# Patient Record
Sex: FEMALE | Race: WHITE | Employment: FULL TIME | URBAN - NONMETROPOLITAN AREA
[De-identification: names, ages, dates, MRNs, and addresses within clinical notes are randomized per-mention and may not be internally consistent; named-entity substitution may affect disease eponyms.]

---

## 2018-05-10 ENCOUNTER — HOSPITAL ENCOUNTER (OUTPATIENT)
Dept: MRI IMAGING | Age: 58
Discharge: HOME | End: 2018-05-10
Payer: COMMERCIAL

## 2018-05-10 DIAGNOSIS — S89.91XA INJURY OF RIGHT KNEE, INITIAL ENCOUNTER: ICD-10-CM

## 2018-05-10 PROCEDURE — 73721 MRI JNT OF LWR EXTRE W/O DYE: CPT

## 2018-05-24 ENCOUNTER — HOSPITAL ENCOUNTER (EMERGENCY)
Age: 58
Discharge: HOME | End: 2018-05-24
Attending: EMERGENCY MEDICINE
Payer: COMMERCIAL

## 2018-05-24 ENCOUNTER — APPOINTMENT (OUTPATIENT)
Dept: GENERAL RADIOLOGY | Age: 58
End: 2018-05-24
Payer: COMMERCIAL

## 2018-05-24 VITALS
HEART RATE: 69 BPM | RESPIRATION RATE: 18 BRPM | SYSTOLIC BLOOD PRESSURE: 167 MMHG | WEIGHT: 189 LBS | BODY MASS INDEX: 35.68 KG/M2 | HEIGHT: 61 IN | DIASTOLIC BLOOD PRESSURE: 84 MMHG | OXYGEN SATURATION: 99 % | TEMPERATURE: 98 F

## 2018-05-24 DIAGNOSIS — S50.11XA CONTUSION OF RIGHT FOREARM, INITIAL ENCOUNTER: Primary | ICD-10-CM

## 2018-05-24 PROCEDURE — 99283 EMERGENCY DEPT VISIT LOW MDM: CPT

## 2018-05-24 PROCEDURE — 73090 X-RAY EXAM OF FOREARM: CPT

## 2018-05-24 RX ORDER — SIMVASTATIN 20 MG
TABLET ORAL
COMMUNITY
Start: 2017-12-11

## 2018-05-24 RX ORDER — CETIRIZINE HYDROCHLORIDE 10 MG/1
TABLET ORAL
COMMUNITY
Start: 2018-05-03

## 2018-05-24 ASSESSMENT — PAIN DESCRIPTION - ORIENTATION
ORIENTATION: RIGHT
ORIENTATION: RIGHT

## 2018-05-24 ASSESSMENT — PAIN DESCRIPTION - DESCRIPTORS
DESCRIPTORS: ACHING;DISCOMFORT
DESCRIPTORS: ACHING

## 2018-05-24 ASSESSMENT — PAIN SCALES - GENERAL
PAINLEVEL_OUTOF10: 7
PAINLEVEL_OUTOF10: 5

## 2018-05-24 ASSESSMENT — PAIN DESCRIPTION - LOCATION
LOCATION: ARM
LOCATION: WRIST

## 2018-05-24 ASSESSMENT — PAIN DESCRIPTION - PAIN TYPE: TYPE: ACUTE PAIN

## 2018-09-25 ENCOUNTER — HOSPITAL ENCOUNTER (OUTPATIENT)
Age: 58
Discharge: HOME | End: 2018-09-25
Payer: COMMERCIAL

## 2018-09-25 ENCOUNTER — HOSPITAL ENCOUNTER (OUTPATIENT)
Dept: GENERAL RADIOLOGY | Age: 58
Discharge: HOME | End: 2018-09-25
Payer: COMMERCIAL

## 2018-09-25 DIAGNOSIS — R52 PAIN: ICD-10-CM

## 2018-09-25 PROCEDURE — 73564 X-RAY EXAM KNEE 4 OR MORE: CPT

## 2019-07-02 ENCOUNTER — HOSPITAL ENCOUNTER (OUTPATIENT)
Dept: GENERAL RADIOLOGY | Age: 59
Discharge: HOME | End: 2019-07-02
Payer: COMMERCIAL

## 2019-07-02 ENCOUNTER — HOSPITAL ENCOUNTER (OUTPATIENT)
Age: 59
Discharge: HOME | End: 2019-07-02
Payer: COMMERCIAL

## 2019-07-02 DIAGNOSIS — M25.561 RIGHT KNEE PAIN, UNSPECIFIED CHRONICITY: ICD-10-CM

## 2019-07-02 PROCEDURE — 73564 X-RAY EXAM KNEE 4 OR MORE: CPT

## 2019-12-10 ENCOUNTER — HOSPITAL ENCOUNTER (OUTPATIENT)
Age: 59
End: 2019-12-10

## 2019-12-10 ENCOUNTER — HOSPITAL ENCOUNTER (OUTPATIENT)
Age: 59
Discharge: HOME | End: 2019-12-10
Payer: COMMERCIAL

## 2019-12-10 ENCOUNTER — HOSPITAL ENCOUNTER (OUTPATIENT)
Dept: GENERAL RADIOLOGY | Age: 59
Discharge: HOME | End: 2019-12-10
Payer: COMMERCIAL

## 2019-12-10 DIAGNOSIS — M25.561 RIGHT KNEE PAIN, UNSPECIFIED CHRONICITY: ICD-10-CM

## 2019-12-10 PROCEDURE — 73564 X-RAY EXAM KNEE 4 OR MORE: CPT

## 2020-01-13 ENCOUNTER — HOSPITAL ENCOUNTER (OUTPATIENT)
Dept: ULTRASOUND IMAGING | Age: 60
Discharge: HOME | End: 2020-01-13
Payer: COMMERCIAL

## 2020-01-13 PROCEDURE — 76770 US EXAM ABDO BACK WALL COMP: CPT

## 2020-06-09 ENCOUNTER — HOSPITAL ENCOUNTER (OUTPATIENT)
Age: 60
Discharge: HOME | End: 2020-06-09
Payer: COMMERCIAL

## 2020-06-09 ENCOUNTER — HOSPITAL ENCOUNTER (OUTPATIENT)
Dept: GENERAL RADIOLOGY | Age: 60
Discharge: HOME | End: 2020-06-09
Payer: COMMERCIAL

## 2020-06-09 PROCEDURE — 73564 X-RAY EXAM KNEE 4 OR MORE: CPT

## 2020-11-04 ENCOUNTER — HOSPITAL ENCOUNTER (OUTPATIENT)
Dept: ULTRASOUND IMAGING | Age: 60
Discharge: HOME | End: 2020-11-04
Payer: COMMERCIAL

## 2020-11-04 PROCEDURE — 76700 US EXAM ABDOM COMPLETE: CPT

## 2020-12-08 ENCOUNTER — HOSPITAL ENCOUNTER (OUTPATIENT)
Age: 60
Discharge: HOME | End: 2020-12-08
Payer: COMMERCIAL

## 2020-12-08 ENCOUNTER — HOSPITAL ENCOUNTER (OUTPATIENT)
Dept: GENERAL RADIOLOGY | Age: 60
Discharge: HOME | End: 2020-12-08
Payer: COMMERCIAL

## 2020-12-08 PROCEDURE — 73564 X-RAY EXAM KNEE 4 OR MORE: CPT

## 2021-01-12 ENCOUNTER — OFFICE VISIT (OUTPATIENT)
Dept: SURGERY | Age: 61
End: 2021-01-12
Payer: COMMERCIAL

## 2021-01-12 VITALS
BODY MASS INDEX: 35.87 KG/M2 | RESPIRATION RATE: 14 BRPM | HEIGHT: 61 IN | DIASTOLIC BLOOD PRESSURE: 78 MMHG | WEIGHT: 190 LBS | TEMPERATURE: 98.2 F | OXYGEN SATURATION: 98 % | SYSTOLIC BLOOD PRESSURE: 122 MMHG | HEART RATE: 96 BPM

## 2021-01-12 DIAGNOSIS — K80.10 CHRONIC CHOLECYSTITIS WITH CALCULUS: Primary | ICD-10-CM

## 2021-01-12 DIAGNOSIS — I10 ESSENTIAL HYPERTENSION: ICD-10-CM

## 2021-01-12 DIAGNOSIS — Z01.818 PRE-OP TESTING: ICD-10-CM

## 2021-01-12 PROCEDURE — 99244 OFF/OP CNSLTJ NEW/EST MOD 40: CPT | Performed by: SURGERY

## 2021-01-12 PROCEDURE — G8484 FLU IMMUNIZE NO ADMIN: HCPCS | Performed by: SURGERY

## 2021-01-12 PROCEDURE — G8417 CALC BMI ABV UP PARAM F/U: HCPCS | Performed by: SURGERY

## 2021-01-12 PROCEDURE — G8427 DOCREV CUR MEDS BY ELIG CLIN: HCPCS | Performed by: SURGERY

## 2021-01-12 RX ORDER — CYCLOSPORINE 0.5 MG/ML
1 EMULSION OPHTHALMIC 2 TIMES DAILY
COMMUNITY

## 2021-01-12 RX ORDER — LOSARTAN POTASSIUM 50 MG/1
TABLET ORAL
COMMUNITY
Start: 2020-11-28

## 2021-01-12 RX ORDER — OMEPRAZOLE 20 MG/1
CAPSULE, DELAYED RELEASE ORAL
COMMUNITY
Start: 2020-12-04

## 2021-01-12 RX ORDER — ERGOCALCIFEROL 1.25 MG/1
CAPSULE ORAL
COMMUNITY
Start: 2020-11-09

## 2021-01-12 RX ORDER — AZELASTINE HYDROCHLORIDE 137 UG/1
SPRAY, METERED NASAL
COMMUNITY
Start: 2020-11-29

## 2021-01-12 ASSESSMENT — ENCOUNTER SYMPTOMS
CHOKING: 0
EYE REDNESS: 0
VOMITING: 0
VOICE CHANGE: 0
SINUS PRESSURE: 0
TROUBLE SWALLOWING: 0
SHORTNESS OF BREATH: 0
CONSTIPATION: 0
DIARRHEA: 0
STRIDOR: 0
NAUSEA: 0
ABDOMINAL DISTENTION: 0
EYE PAIN: 0
COUGH: 0
ABDOMINAL PAIN: 1
RHINORRHEA: 0
EYE DISCHARGE: 0
CHEST TIGHTNESS: 0
BACK PAIN: 0
PHOTOPHOBIA: 0
BLOOD IN STOOL: 0
WHEEZING: 0
COLOR CHANGE: 0
RECTAL PAIN: 0
FACIAL SWELLING: 0
EYE ITCHING: 0
ANAL BLEEDING: 0
SORE THROAT: 0
APNEA: 0

## 2021-01-12 NOTE — PROGRESS NOTES
Subjective:      Patient ID: Bill Finney is a 56 y.o. female. Chief Complaint   Patient presents with    Surgical Consult     new patient- ref K. leopold- gallstones- fatty liver- US 11/4       HPI    Review of Systems   Constitutional: Negative for activity change, appetite change, chills, diaphoresis, fatigue, fever and unexpected weight change. HENT: Negative for congestion, dental problem, drooling, ear discharge, ear pain, facial swelling, hearing loss, mouth sores, nosebleeds, postnasal drip, rhinorrhea, sinus pressure, sneezing, sore throat, tinnitus, trouble swallowing and voice change. Eyes: Negative for photophobia, pain, discharge, redness, itching and visual disturbance. Respiratory: Negative for apnea, cough, choking, chest tightness, shortness of breath, wheezing and stridor. Cardiovascular: Negative for chest pain, palpitations and leg swelling. Gastrointestinal: Positive for abdominal pain. Negative for abdominal distention, anal bleeding, blood in stool, constipation, diarrhea, nausea, rectal pain and vomiting. Genitourinary: Negative for decreased urine volume, difficulty urinating, dyspareunia, dysuria, enuresis, flank pain, frequency, genital sores, hematuria, menstrual problem, pelvic pain, urgency, vaginal bleeding, vaginal discharge and vaginal pain. Musculoskeletal: Negative for arthralgias, back pain, gait problem, joint swelling, myalgias, neck pain and neck stiffness. Skin: Negative for color change, pallor, rash and wound. Neurological: Negative for dizziness, tremors, seizures, syncope, facial asymmetry, speech difficulty, weakness, light-headedness, numbness and headaches. Hematological: Negative for adenopathy. Does not bruise/bleed easily. Psychiatric/Behavioral: Negative for agitation, behavioral problems, confusion, decreased concentration, dysphoric mood, hallucinations, self-injury, sleep disturbance and suicidal ideas. The patient is not nervous/anxious and is not hyperactive.       /78 (Site: Right Upper Arm, Position: Sitting, Cuff Size: Medium Adult)   Pulse 96   Temp 98.2 °F (36.8 °C) (Temporal)   Resp 14   Ht 5' 1\" (1.549 m)   Wt 190 lb (86.2 kg)   SpO2 98%   BMI 35.90 kg/m²     Objective:   Physical Exam    Assessment:            Plan:              [employer], LPN

## 2021-01-12 NOTE — PROGRESS NOTES
Nargis Blanco. Carol, [address]  [phone]  New Patient Evaluation in Office    Pt Name: Bill Finney  Date of Birth 1960   Today's Date: 1/12/2021  Medical Record Number: [id number]  Referring Provider: Simeon Garcia MD  Primary Care Provider: Mary Welch MD  Chief Complaint   Patient presents with    Surgical Consult     new patient- ref K. leopold- gallstones- fatty liver- US 11/4     ASSESSMENT       Diagnosis Orders   1. Chronic cholecystitis with calculus  LAPAROSCOPY CHOLECYSTECTOMY    Hemoglobin and Hematocrit, Blood    Hepatic Function Panel    Covid-19 Ambulatory    Basic Metabolic Panel    EKG 12 Lead   2. Pre-op testing  Hemoglobin and Hematocrit, Blood    Hepatic Function Panel    Covid-19 Ambulatory    Basic Metabolic Panel    EKG 12 Lead   3. Essential hypertension  Hemoglobin and Hematocrit, Blood    Hepatic Function Panel    Covid-19 Ambulatory    Basic Metabolic Panel    EKG 12 Lead     Past Medical History:   Diagnosis Date    Arthritis     knees    Depression     Essential hypertension     Fatty liver     Pure hypercholesterolemia     Reactive airway disease     Ureteral stone           PLANS      1.  Schedule Dianne for L/S cholecystectomy possible open 2. The risks, options and alternatives were discussed at length with the patient. The risks including bleeding, infection, reoperation, bile duct injury and possible conversion to an open procedure were covered as well as nonresolution of pain. The possibility of other unforeseen complications including bile leak were also mentioned. Patient was made aware of intraoperative complications such as other organ injury or injury to surrounding structures. We also discussed the conduct of the operation, probable outpatient stay postoperatively and the typical post operative recovery and restrictions. The patients questions were answered. After this discussion, the patient would like to proceed with cholecystectomy. 3. Status: outpatient  4. Planned anesthesia: general  5. She will undergo pre-operative clearance per anesthesia guidelines with risk factors listed under the past medical history diagnosis & problem list.     Twyla Villalpando is a 56 y.o. female seen in the consultation for evaluation regarding gallbladder. Onset of symptoms was gradual 6 months ago with gradually worsened since that time. The symptoms have included right upper quadrant pain and pain radiating to the back. Chip Velasquez describes the pain as aching, recurrent and moderate in severity. Her symptoms are worse with breads and improved with avoiding these foods. She denies any history of pancreatitis, jaundice, pancreatitis or ulcer disease. Symptoms have been unrelieved by antacids. She also complains of some LUQ pain that is near a robotic surgical incision. I have personally reviewed the following films:  US performed on 11/4/20 which showed fatty infiltration of the liver, gallstones without wall thickening or ductal dilation.    Past Medical History  Past Medical History:   Diagnosis Date    Arthritis     knees    Depression     Essential hypertension     Fatty liver     Pure hypercholesterolemia     Reactive airway disease  Ureteral stone      Past Surgical History  Past Surgical History:   Procedure Laterality Date    BLADDER REPAIR      COLONOSCOPY      CYSTOSCOPY  01/01/2011    stent placed     HYSTERECTOMY  12/14/2012 Dr Macrina Downey Hysterectomy Per Dr Ana Dillon. Sacral Colpopexy, single Incision Sub Urethral Sling Placed and Removed Due to Urethral Injury by Dr Stark Reas LITHOTRIPSY  01/01/2011    OVARY REMOVAL  01/01/2008    right     Medications  Current Outpatient Medications   Medication Sig Dispense Refill    losartan (COZAAR) 50 MG tablet TAKE 1 TABLET BY MOUTH ONCE DAILY      omeprazole (PRILOSEC) 20 MG delayed release capsule TAKE 1 CAPSULE BY MOUTH ONCE DAILY      Azelastine HCl 137 MCG/SPRAY SOLN USE 1 SPRAY(S) IN EACH NOSTRIL TWICE DAILY      cycloSPORINE (RESTASIS) 0.05 % ophthalmic emulsion 1 drop 2 times daily      simvastatin (ZOCOR) 20 MG tablet   See Instructions, TAKE ONE TABLET BY MOUTH ONCE DAILY, tabs, 11 Refill(s), # 30, eRx: [id number]      cetirizine (ZYRTEC) 10 MG tablet   1 tabs, Oral, Daily, PRN, # 10 tabs, 0 Refill(s)      tolterodine (DETROL LA) 4 MG ER capsule Take 4 mg by mouth daily.  Cetirizine HCl (ZYRTEC ALLERGY PO) Take  by mouth as needed.  Acetaminophen (TYLENOL EXTRA STRENGTH PO) Take  by mouth as needed.  Ibuprofen (MOTRIN PO) Take  by mouth as needed.  vitamin D (ERGOCALCIFEROL) 1.25 MG (84777 UT) CAPS capsule TAKE 1 CAPSULE BY MOUTH WEEKLY FOR 8 WEEKS       No current facility-administered medications for this visit. Allergies  is allergic to amoxicillin-pot clavulanate. Family History  family history includes Cancer in her mother; Heart Disease in her father and mother; Stroke in her paternal grandfather and paternal grandmother.   Social History reports that she quit smoking about 31 years ago. She has never used smokeless tobacco. She reports current alcohol use. She reports that she does not use drugs. Health Screening Exams  Health Maintenance   Topic Date Due    Hepatitis C screen  1960    Lipid screen  05/07/1968    HIV screen  05/07/1973    DTaP/Tdap/Td vaccine (1 - Tdap) 05/07/1977    Cervical cancer screen  05/07/1979    Diabetes screen  05/07/1998    Breast cancer screen  05/07/2008    Shingles Vaccine (1 of 2) 05/07/2008    Colon cancer screen colonoscopy  05/07/2008    Flu vaccine (1) 09/01/2020    Hepatitis A vaccine  Aged Out    Hepatitis B vaccine  Aged Out    Hib vaccine  Aged Out    Meningococcal (ACWY) vaccine  Aged Out    Pneumococcal 0-64 years Vaccine  Aged Out     Review of Systems  Constitutional: Negative for activity change, appetite change, chills, diaphoresis, fatigue, fever and unexpected weight change. HENT: Negative for congestion, dental problem, drooling, ear discharge, ear pain, facial swelling, hearing loss, mouth sores, nosebleeds, postnasal drip, rhinorrhea, sinus pressure, sneezing, sore throat, tinnitus, trouble swallowing and voice change. Eyes: Negative for photophobia, pain, discharge, redness, itching and visual disturbance. Respiratory: Negative for apnea, cough, choking, chest tightness, shortness of breath, wheezing and stridor. Cardiovascular: Negative for chest pain, palpitations and leg swelling. Gastrointestinal: Positive for abdominal pain. Negative for abdominal distention, anal bleeding, blood in stool, constipation, diarrhea, nausea, rectal pain and vomiting. Genitourinary: Negative for decreased urine volume, difficulty urinating, dyspareunia, dysuria, enuresis, flank pain, frequency, genital sores, hematuria, menstrual problem, pelvic pain, urgency, vaginal bleeding, vaginal discharge and vaginal pain. Musculoskeletal: Negative for arthralgias, back pain, gait problem, joint swelling, myalgias, neck pain and neck stiffness. Skin: Negative for color change, pallor, rash and wound. Neurological: Negative for dizziness, tremors, seizures, syncope, facial asymmetry, speech difficulty, weakness, light-headedness, numbness and headaches. Hematological: Negative for adenopathy. Does not bruise/bleed easily. Psychiatric/Behavioral: Negative for agitation, behavioral problems, confusion, decreased concentration, dysphoric mood, hallucinations, self-injury, sleep disturbance and suicidal ideas. The patient is not nervous/anxious and is not hyperactive. OBJECTIVE      VITALS:  height is 5' 1\" (1.549 m) and weight is 190 lb (86.2 kg). Her temporal temperature is 98.2 °F (36.8 °C). Her blood pressure is 122/78 and her pulse is 96. Her respiration is 14 and oxygen saturation is 98%. Pain Score:   0 - No pain Body mass index is 35.9 kg/m². CONSTITUTIONAL: Alert and oriented times 3, no acute distress and cooperative to examination with proper mood and affect. SKIN: Skin color, texture, turgor normal. No rashes or lesions. LYMPH: no cervical nodes, no inguinal nodes  HEENT: Head is normocephalic, atraumatic. EOMI, PERRLA. NECK: Supple, symmetrical, trachea midline, no adenopathy, thyroid symmetric, not enlarged and no tenderness, skin normal.  CHEST/LUNGS: chest symmetric with normal A/P diameter, normal respiratory rate and rhythm, lungs clear to auscultation without wheezes, rales or rhonchi. No accessory muscle use. Scars None   CARDIOVASCULAR: Heart sounds are normal.  Regular rate and rhythm without murmur, gallop or rub. Normal S1 and S2. Carotid and femoral pulses 2+/4 and equal bilaterally. ABDOMEN: Normal shape. Laparoscopic scar(s) present. Normal bowel sounds. No bruits. soft, nontender, nondistended, no masses or organomegaly. no evidence of hernia. Percussion: Normal without hepatosplenomegally. Gallbladder is nonpalpable and non tender. RECTAL: deferred, not clinically indicated  NEUROLOGIC: There are no focalizing motor or sensory deficits. CN II-XII are grossly intact. Eren Kick EXTREMITIES: no cyanosis, no clubbing and no edema. Thank you for the interesting evaluation. Further recommendations as listed above.        Electronically signed by Adrienne Meyer DO on 1/12/2021 at 12:12 PM

## 2021-01-12 NOTE — [place]  [address] [facility]. [address]  [place] [address]  [phone]     Pt Name: [facility] Record Number: [id number]  Date of Birth 1960   Today's Date: 1/12/2021 [address] Mitali Eastman was seen in consultation in the office today. My assessment and plans are listed below. ASSESSMENT      Diagnosis Orders   1. Chronic cholecystitis with calculus  LAPAROSCOPY CHOLECYSTECTOMY    Hemoglobin and Hematocrit, Blood    Hepatic Function Panel    Covid-19 Ambulatory    Basic Metabolic Panel    EKG 12 Lead   2. Pre-op testing  Hemoglobin and Hematocrit, Blood    Hepatic Function Panel    Covid-19 Ambulatory    Basic Metabolic Panel    EKG 12 Lead   3. Essential hypertension  Hemoglobin and Hematocrit, Blood    Hepatic Function Panel    Covid-19 Ambulatory    Basic Metabolic Panel    EKG 12 Lead     Past Medical History:   Diagnosis Date    Arthritis     knees    Depression     Essential hypertension     Fatty liver     Pure hypercholesterolemia     Reactive airway disease     Ureteral stone          PLANS:   1. Schedule Dianne for L/S cholecystectomy possible open  2. The risks, options and alternatives were discussed at length with the patient. The risks including bleeding, infection, reoperation, bile duct injury and possible conversion to an open procedure were covered as well as nonresolution of pain. The possibility of other unforeseen complications including bile leak were also mentioned. Patient was made aware of intraoperative complications such as other organ injury or injury to surrounding structures. We also discussed the conduct of the operation, probable outpatient stay postoperatively and the typical post operative recovery and restrictions. The patients questions were answered. After this discussion, the patient would like to proceed with cholecystectomy. 3. Status: outpatient  4.  Planned anesthesia: general 5. She will undergo pre-operative clearance per anesthesia guidelines with risk factors listed under the past medical history diagnosis & problem list.    If I can provide any additional assistance or you have any concerns, please feel free to contact me. Thank you for allowing to participate in the care of your patients. Sincerely,      Samantha Armstrong.  Florence Medina

## 2021-02-15 ENCOUNTER — APPOINTMENT (OUTPATIENT)
Dept: GENERAL RADIOLOGY | Age: 61
End: 2021-02-15
Payer: COMMERCIAL

## 2021-02-15 ENCOUNTER — HOSPITAL ENCOUNTER (EMERGENCY)
Age: 61
Discharge: HOME | End: 2021-02-15
Attending: EMERGENCY MEDICINE
Payer: COMMERCIAL

## 2021-02-15 VITALS
SYSTOLIC BLOOD PRESSURE: 135 MMHG | HEIGHT: 61 IN | HEART RATE: 85 BPM | WEIGHT: 186 LBS | RESPIRATION RATE: 17 BRPM | BODY MASS INDEX: 35.12 KG/M2 | OXYGEN SATURATION: 94 % | DIASTOLIC BLOOD PRESSURE: 66 MMHG

## 2021-02-15 DIAGNOSIS — S42.202A CLOSED FRACTURE OF PROXIMAL END OF LEFT HUMERUS, UNSPECIFIED FRACTURE MORPHOLOGY, INITIAL ENCOUNTER: Primary | ICD-10-CM

## 2021-02-15 PROCEDURE — 99283 EMERGENCY DEPT VISIT LOW MDM: CPT

## 2021-02-15 PROCEDURE — L3660 SO 8 AB RSTR CAN/WEB PRE OTS: HCPCS

## 2021-02-15 PROCEDURE — 6370000000 HC RX 637 (ALT 250 FOR IP): Performed by: EMERGENCY MEDICINE

## 2021-02-15 PROCEDURE — 73030 X-RAY EXAM OF SHOULDER: CPT

## 2021-02-15 RX ORDER — HYDROCODONE BITARTRATE AND ACETAMINOPHEN 5; 325 MG/1; MG/1
1 TABLET ORAL ONCE
Status: COMPLETED | OUTPATIENT
Start: 2021-02-15 | End: 2021-02-15

## 2021-02-15 RX ADMIN — HYDROCODONE BITARTRATE AND ACETAMINOPHEN 1 TABLET: 5; 325 TABLET ORAL at 19:32

## 2021-02-15 ASSESSMENT — ENCOUNTER SYMPTOMS
RESPIRATORY NEGATIVE: 1
BACK PAIN: 0

## 2021-02-15 ASSESSMENT — PAIN - FUNCTIONAL ASSESSMENT: PAIN_FUNCTIONAL_ASSESSMENT: PREVENTS OR INTERFERES SOME ACTIVE ACTIVITIES AND ADLS

## 2021-02-15 ASSESSMENT — PAIN SCALES - GENERAL
PAINLEVEL_OUTOF10: 8
PAINLEVEL_OUTOF10: 5

## 2021-02-15 ASSESSMENT — PAIN DESCRIPTION - ORIENTATION: ORIENTATION: LEFT

## 2021-02-15 ASSESSMENT — PAIN DESCRIPTION - LOCATION: LOCATION: SHOULDER

## 2021-02-15 ASSESSMENT — PAIN DESCRIPTION - PAIN TYPE: TYPE: ACUTE PAIN

## 2021-02-16 ENCOUNTER — HOSPITAL ENCOUNTER (OUTPATIENT)
Dept: CT IMAGING | Age: 61
Discharge: HOME | End: 2021-02-16
Payer: COMMERCIAL

## 2021-02-16 ENCOUNTER — HOSPITAL ENCOUNTER (OUTPATIENT)
Dept: GENERAL RADIOLOGY | Age: 61
Discharge: HOME | End: 2021-02-16
Payer: COMMERCIAL

## 2021-02-16 ENCOUNTER — HOSPITAL ENCOUNTER (OUTPATIENT)
Age: 61
Discharge: HOME | End: 2021-02-16
Payer: COMMERCIAL

## 2021-02-16 DIAGNOSIS — S42.295A OTHER CLOSED NONDISPLACED FRACTURE OF PROXIMAL END OF LEFT HUMERUS, INITIAL ENCOUNTER: ICD-10-CM

## 2021-02-16 DIAGNOSIS — Z01.811 ENCOUNTER FOR PREPROCEDURAL RESPIRATORY EXAMINATION: ICD-10-CM

## 2021-02-16 DIAGNOSIS — Z01.812 ENCOUNTER FOR PREPROCEDURAL LABORATORY EXAMINATION: ICD-10-CM

## 2021-02-16 DIAGNOSIS — Z01.810 ENCOUNTER FOR PREPROCEDURAL CARDIOVASCULAR EXAMINATION: ICD-10-CM

## 2021-02-16 DIAGNOSIS — Z22.322 MRSA CARRIER: ICD-10-CM

## 2021-02-16 DIAGNOSIS — Z20.828 CONTACT WITH AND (SUSPECTED) EXPOSURE TO OTHER VIRAL COMMUNICABLE DISEASES: ICD-10-CM

## 2021-02-16 LAB
ANION GAP: 11 MEQ/L (ref 8–16)
BUN BLDV-MCNC: 16 MG/DL (ref 7–18)
CHLORIDE BLD-SCNC: 105 MEQ/L (ref 98–107)
CO2: 25 MEQ/L (ref 21–32)
CREAT SERPL-MCNC: 0.5 MG/DL (ref 0.6–1.3)
GFR, ESTIMATED: > 90 ML/MIN/1.73M2
GLUCOSE BLD-MCNC: 101 MG/DL (ref 74–106)
HCT VFR BLD CALC: 43.1 % (ref 37–47)
HEMOGLOBIN: 14 GM/DL (ref 12–16)
MCH RBC QN AUTO: 30.7 PG (ref 27–31)
MCHC RBC AUTO-ENTMCNC: 32.4 GM/DL (ref 33–37)
MCV RBC AUTO: 94.6 FL (ref 81–99)
PDW BLD-RTO: 13.5 % (ref 11.5–14.5)
PLATELET # BLD: 382 THOU/MM3 (ref 130–400)
PMV BLD AUTO: 6.9 FL (ref 7.4–10.4)
POC CALCIUM: 9.2 MG/DL (ref 8.5–10.1)
POTASSIUM SERPL-SCNC: 3.9 MEQ/L (ref 3.5–5.1)
RBC # BLD: 4.55 MILL/MM3 (ref 4.2–5.4)
SARS-COV-2, NAAT: NOT DETECTED
SODIUM BLD-SCNC: 141 MEQ/L (ref 136–145)
WBC # BLD: 9.4 THOU/MM3 (ref 4.8–10.8)

## 2021-02-16 PROCEDURE — 85027 COMPLETE CBC AUTOMATED: CPT

## 2021-02-16 PROCEDURE — 87641 MR-STAPH DNA AMP PROBE: CPT

## 2021-02-16 PROCEDURE — 80048 BASIC METABOLIC PNL TOTAL CA: CPT

## 2021-02-16 PROCEDURE — 73200 CT UPPER EXTREMITY W/O DYE: CPT

## 2021-02-16 PROCEDURE — 36415 COLL VENOUS BLD VENIPUNCTURE: CPT

## 2021-02-16 PROCEDURE — 87635 SARS-COV-2 COVID-19 AMP PRB: CPT

## 2021-02-16 PROCEDURE — 71045 X-RAY EXAM CHEST 1 VIEW: CPT

## 2021-02-16 PROCEDURE — 87640 STAPH A DNA AMP PROBE: CPT

## 2021-02-16 NOTE — ED PROVIDER NOTES
[facility]  eMERGENCY dEPARTMENT eNCOUnter             Jordin Barreto 19 COMPLAINT    Chief Complaint   Patient presents with    Shoulder Injury     left       Nurses Notes reviewed and I agree except as noted in the HPI. HPI    Jameel Valle is a 56 y.o. female who presents with injury to the left shoulder, sustained just prior to arrival when she was going down her basement steps, missed a step, and fell onto the left side of her body. She has severe pain in the left shoulder and an abrasion to the left knee. No injury to head, neck, or back. She is ambulatory, holding her left arm protectively against her body. REVIEW OF SYSTEMS      Review of Systems   Constitutional: Negative. HENT: Negative. Respiratory: Negative. Cardiovascular: Negative. Musculoskeletal: Negative for back pain and neck pain. Neurological: Negative for dizziness, loss of consciousness and headaches. All other systems reviewed and are negative. PAST MEDICAL HISTORY     has a past medical history of Arthritis, Depression, Essential hypertension, Fatty liver, Pure hypercholesterolemia, Reactive airway disease, and Ureteral stone. SURGICAL HISTORY     has a past surgical history that includes Ovary removal (01/01/2008); Lithotripsy (01/01/2011); Cystoscopy (01/01/2011); Colonoscopy; Hysterectomy (12/14/2012 Dr Desmond Peguero); and bladder repair.     CURRENT MEDICATIONS    Discharge Medication List as of 2/15/2021  8:32 PM      CONTINUE these medications which have NOT CHANGED    Details   losartan (COZAAR) 50 MG tablet TAKE 1 TABLET BY MOUTH ONCE DAILYHistorical Med      vitamin D (ERGOCALCIFEROL) 1.25 MG (44195 UT) CAPS capsule TAKE 1 CAPSULE BY MOUTH WEEKLY FOR 8 WEEKSHistorical Med      omeprazole (PRILOSEC) 20 MG delayed release capsule TAKE 1 CAPSULE BY MOUTH ONCE DAILYHistorical Med      Azelastine HCl 137 MCG/SPRAY SOLN USE 1 SPRAY(S) IN [facility] NOSTRIL TWICE DAILYHistorical Med      cycloSPORINE (RESTASIS) 0.05 % ophthalmic emulsion 1 drop 2 times dailyHistorical Med      simvastatin (ZOCOR) 20 MG tablet   See Instructions, TAKE ONE TABLET BY MOUTH ONCE DAILY, tabs, 11 Refill(s), # 30, eRx: [id number] 2542Historical Med      !! cetirizine (ZYRTEC) 10 MG tablet   1 tabs, Oral, Daily, PRN, # 10 tabs, 0 Refill(s)Historical Med      !! Cetirizine HCl (ZYRTEC ALLERGY PO) Take  by mouth as needed. Acetaminophen (TYLENOL EXTRA STRENGTH PO) Take  by mouth as needed. Ibuprofen (MOTRIN PO) Take  by mouth as needed. !! - Potential duplicate medications found. Please discuss with provider. ALLERGIES    is allergic to amoxicillin-pot clavulanate. FAMILY HISTORY    She indicated that her mother is . She indicated that her father is . She indicated that the status of her paternal grandmother is unknown. She indicated that the status of her paternal grandfather is unknown.   family history includes Cancer in her mother; Heart Disease in her father and mother; Stroke in her paternal grandfather and paternal grandmother. SOCIAL HISTORY     reports that she quit smoking about 31 years ago. She has never used smokeless tobacco. She reports current alcohol use. She reports that she does not use drugs. PHYSICAL EXAM       INITIAL VITALS: /66   Pulse 85   Resp 17   Ht 5' 1\" (1.549 m)   Wt 186 lb (84.4 kg)   SpO2 94%   BMI 35.14 kg/m²      Physical Exam  Vitals signs and nursing note reviewed. Exam conducted with a chaperone present. Constitutional:       General: She is in acute distress. HENT:      Head: Atraumatic. Eyes:      Pupils: Pupils are equal, round, and reactive to light. Neck:      Musculoskeletal: Neck supple. No muscular tenderness. Musculoskeletal:      Comments: Tender, swollen left shoulder, unable to check ROM, no deformity. Skin:     General: Skin is warm and dry.       Capillary Refill: Capillary refill takes less than 2 seconds. Neurological:      General: No focal deficit present. Mental Status: She is alert and oriented to person, place, and time. Psychiatric:         Behavior: Behavior normal.           RADIOLOGY:    XR SHOULDER LEFT (MIN 2 VIEWS)   Final Result       1. Fracture in the humeral head laterally. 2. Degenerative change involving the acromioclavicular and to a lesser extent glenohumeral joints. **This report has been created using voice recognition software. It may contain minor errors which are inherent in voice recognition technology. **      Final report electronically signed by DR Camila Fermin on 2/15/2021 7:43 PM                 Vitals:    Vitals:    02/15/21 1917   BP: 135/66   Pulse: 85   Resp: 17   SpO2: 94%   Weight: 186 lb (84.4 kg)   Height: 5' 1\" (1.549 m)       EMERGENCY DEPARTMENT COURSE:    Norco given for pain. X-ray results discussed, sling and swathe applied to the left shoulder by the nurse, confirmed appropriate by me, normal neurovascular status before and after placement. FINAL IMPRESSION      1.  Closed fracture of proximal end of left humerus, unspecified fracture morphology, initial encounter        DISPOSITION/PLAN    DISPOSITION Decision To Discharge 02/15/2021 07:51:14 PM      PATIENT REFERRED TO:    Kiki Roque MD  [address]  [phone]      Tuesday 2/16/2021 as scheduled at 12:40 PM.      DISCHARGE MEDICATIONS:    Discharge Medication List as of 2/15/2021  8:32 PM             (Please note that portions of this note were completed with a voice recognition program.  Efforts were made to edit the dictations but occasionally words are mis-transcribed.)      Ludy Melton MD  02/15/21 [id number]

## 2021-02-16 NOTE — ED NOTES
Discharge teaching and instructions for condition explained to patient. AVS reviewed. Patient voiced understanding regarding prescriptions, follow up appointments and care of self at home. Pt discharged to home in stable condition per self with .      Beau Bernabe RN  02/15/21 2037       Beau Bernabe RN  02/15/21 2037

## 2021-02-17 ENCOUNTER — ANESTHESIA (OUTPATIENT)
Dept: OPERATING ROOM | Age: 61
End: 2021-02-17
Payer: COMMERCIAL

## 2021-02-17 ENCOUNTER — APPOINTMENT (OUTPATIENT)
Dept: GENERAL RADIOLOGY | Age: 61
End: 2021-02-17
Attending: ORTHOPAEDIC SURGERY
Payer: COMMERCIAL

## 2021-02-17 ENCOUNTER — HOSPITAL ENCOUNTER (OUTPATIENT)
Age: 61
Setting detail: OUTPATIENT SURGERY
Discharge: HOME | End: 2021-02-17
Attending: ORTHOPAEDIC SURGERY | Admitting: ORTHOPAEDIC SURGERY
Payer: COMMERCIAL

## 2021-02-17 ENCOUNTER — ANESTHESIA EVENT (OUTPATIENT)
Dept: OPERATING ROOM | Age: 61
End: 2021-02-17
Payer: COMMERCIAL

## 2021-02-17 VITALS
WEIGHT: 187.6 LBS | HEART RATE: 79 BPM | HEIGHT: 61 IN | DIASTOLIC BLOOD PRESSURE: 63 MMHG | SYSTOLIC BLOOD PRESSURE: 130 MMHG | OXYGEN SATURATION: 92 % | BODY MASS INDEX: 35.42 KG/M2 | TEMPERATURE: 97.1 F | RESPIRATION RATE: 18 BRPM

## 2021-02-17 VITALS — DIASTOLIC BLOOD PRESSURE: 51 MMHG | TEMPERATURE: 98.1 F | SYSTOLIC BLOOD PRESSURE: 98 MMHG | OXYGEN SATURATION: 100 %

## 2021-02-17 DIAGNOSIS — S42.292A CLOSED 3-PART FRACTURE OF PROXIMAL END OF LEFT HUMERUS, INITIAL ENCOUNTER: Primary | ICD-10-CM

## 2021-02-17 PROBLEM — S42.292D CLOSED 3-PART FRACTURE OF PROXIMAL HUMERUS WITH ROUTINE HEALING, LEFT: Status: ACTIVE | Noted: 2021-02-17

## 2021-02-17 LAB
MRSA NASAL SCREEN RT-PCR: NEGATIVE
STAPH AUREUS SCREEN RT-PCR: NEGATIVE

## 2021-02-17 PROCEDURE — 6360000002 HC RX W HCPCS: Performed by: NURSE ANESTHETIST, CERTIFIED REGISTERED

## 2021-02-17 PROCEDURE — C1713 ANCHOR/SCREW BN/BN,TIS/BN: HCPCS | Performed by: ORTHOPAEDIC SURGERY

## 2021-02-17 PROCEDURE — 2500000003 HC RX 250 WO HCPCS: Performed by: ANESTHESIOLOGY

## 2021-02-17 PROCEDURE — 6360000002 HC RX W HCPCS: Performed by: ANESTHESIOLOGY

## 2021-02-17 PROCEDURE — 64415 NJX AA&/STRD BRCH PLXS IMG: CPT | Performed by: ANESTHESIOLOGY

## 2021-02-17 PROCEDURE — 3600000004 HC SURGERY LEVEL 4 BASE: Performed by: ORTHOPAEDIC SURGERY

## 2021-02-17 PROCEDURE — 7100000010 HC PHASE II RECOVERY - FIRST 15 MIN: Performed by: ORTHOPAEDIC SURGERY

## 2021-02-17 PROCEDURE — 6360000002 HC RX W HCPCS: Performed by: ORTHOPAEDIC SURGERY

## 2021-02-17 PROCEDURE — 2580000003 HC RX 258: Performed by: ORTHOPAEDIC SURGERY

## 2021-02-17 PROCEDURE — 3700000001 HC ADD 15 MINUTES (ANESTHESIA): Performed by: ORTHOPAEDIC SURGERY

## 2021-02-17 PROCEDURE — 7100000000 HC PACU RECOVERY - FIRST 15 MIN: Performed by: ORTHOPAEDIC SURGERY

## 2021-02-17 PROCEDURE — 7100000011 HC PHASE II RECOVERY - ADDTL 15 MIN: Performed by: ORTHOPAEDIC SURGERY

## 2021-02-17 PROCEDURE — C2626 INFUSION PUMP, NON-PROG,TEMP: HCPCS | Performed by: ORTHOPAEDIC SURGERY

## 2021-02-17 PROCEDURE — 73030 X-RAY EXAM OF SHOULDER: CPT

## 2021-02-17 PROCEDURE — 2709999900 HC NON-CHARGEABLE SUPPLY: Performed by: ORTHOPAEDIC SURGERY

## 2021-02-17 PROCEDURE — 3600000014 HC SURGERY LEVEL 4 ADDTL 15MIN: Performed by: ORTHOPAEDIC SURGERY

## 2021-02-17 PROCEDURE — 6370000000 HC RX 637 (ALT 250 FOR IP): Performed by: PHYSICIAN ASSISTANT

## 2021-02-17 PROCEDURE — 2500000003 HC RX 250 WO HCPCS: Performed by: NURSE ANESTHETIST, CERTIFIED REGISTERED

## 2021-02-17 PROCEDURE — 7100000001 HC PACU RECOVERY - ADDTL 15 MIN: Performed by: ORTHOPAEDIC SURGERY

## 2021-02-17 PROCEDURE — 3700000000 HC ANESTHESIA ATTENDED CARE: Performed by: ORTHOPAEDIC SURGERY

## 2021-02-17 PROCEDURE — 3209999900 FLUORO FOR SURGICAL PROCEDURES

## 2021-02-17 DEVICE — EVOS 3.5MM X 36MM LOCKING SCREW SELF-TAPPING
Type: IMPLANTABLE DEVICE | Status: FUNCTIONAL
Brand: EVOS

## 2021-02-17 DEVICE — EVOS 3.5MM X 24MM LOCKING SCREW SELF-TAPPING
Type: IMPLANTABLE DEVICE | Status: FUNCTIONAL
Brand: EVOS

## 2021-02-17 DEVICE — EVOS 3.5MM CURVED PROXIMAL HUMERUS                                    PLATE 4 HOLE LEFT 92MM
Type: IMPLANTABLE DEVICE | Status: FUNCTIONAL
Brand: EVOS

## 2021-02-17 DEVICE — EVOS 3.5MM X 26MM LOCKING SCREW SELF-TAPPING
Type: IMPLANTABLE DEVICE | Status: FUNCTIONAL
Brand: EVOS

## 2021-02-17 DEVICE — EVOS 3.5MM X 38MM LOCKING SCREW SELF-TAPPING
Type: IMPLANTABLE DEVICE | Status: FUNCTIONAL
Brand: EVOS

## 2021-02-17 DEVICE — EVOS 3.5MM X 34MM LOCKING SCREW SELF-TAPPING
Type: IMPLANTABLE DEVICE | Status: FUNCTIONAL
Brand: EVOS

## 2021-02-17 DEVICE — EVOS 3.5MM X 22MM CORTEX SCREW SELF-TAPPING
Type: IMPLANTABLE DEVICE | Status: FUNCTIONAL
Brand: EVOS

## 2021-02-17 DEVICE — EVOS 3.5MM X 30MM LOCKING SCREW SELF-TAPPING
Type: IMPLANTABLE DEVICE | Status: FUNCTIONAL
Brand: EVOS

## 2021-02-17 DEVICE — EVOS 3.5MM X 32MM LOCKING SCREW SELF-TAPPING
Type: IMPLANTABLE DEVICE | Status: FUNCTIONAL
Brand: EVOS

## 2021-02-17 DEVICE — EVOS 3.5MM X 22MM LOCKING SCREW SELF-TAPPING
Type: IMPLANTABLE DEVICE | Status: FUNCTIONAL
Brand: EVOS

## 2021-02-17 RX ORDER — DEXAMETHASONE SODIUM PHOSPHATE 10 MG/ML
INJECTION, EMULSION INTRAMUSCULAR; INTRAVENOUS PRN
Status: DISCONTINUED | OUTPATIENT
Start: 2021-02-17 | End: 2021-02-17 | Stop reason: SDUPTHER

## 2021-02-17 RX ORDER — FENTANYL CITRATE 50 UG/ML
50 INJECTION, SOLUTION INTRAMUSCULAR; INTRAVENOUS EVERY 5 MIN PRN
Status: DISCONTINUED | OUTPATIENT
Start: 2021-02-17 | End: 2021-02-17 | Stop reason: HOSPADM

## 2021-02-17 RX ORDER — LABETALOL 20 MG/4 ML (5 MG/ML) INTRAVENOUS SYRINGE
5 4 TIMES DAILY PRN
Status: DISCONTINUED | OUTPATIENT
Start: 2021-02-17 | End: 2021-02-17 | Stop reason: HOSPADM

## 2021-02-17 RX ORDER — HYDROCODONE BITARTRATE AND ACETAMINOPHEN 5; 325 MG/1; MG/1
1 TABLET ORAL EVERY 4 HOURS PRN
Status: DISCONTINUED | OUTPATIENT
Start: 2021-02-17 | End: 2021-02-17 | Stop reason: HOSPADM

## 2021-02-17 RX ORDER — HYDROCODONE BITARTRATE AND ACETAMINOPHEN 5; 325 MG/1; MG/1
2 TABLET ORAL EVERY 4 HOURS PRN
Status: DISCONTINUED | OUTPATIENT
Start: 2021-02-17 | End: 2021-02-17 | Stop reason: HOSPADM

## 2021-02-17 RX ORDER — BUPIVACAINE HYDROCHLORIDE 5 MG/ML
INJECTION, SOLUTION EPIDURAL; INTRACAUDAL
Status: COMPLETED | OUTPATIENT
Start: 2021-02-17 | End: 2021-02-17

## 2021-02-17 RX ORDER — MEPERIDINE HYDROCHLORIDE 25 MG/ML
12.5 INJECTION INTRAMUSCULAR; INTRAVENOUS; SUBCUTANEOUS EVERY 5 MIN PRN
Status: DISCONTINUED | OUTPATIENT
Start: 2021-02-17 | End: 2021-02-17 | Stop reason: HOSPADM

## 2021-02-17 RX ORDER — GLYCOPYRROLATE 1 MG/5 ML
SYRINGE (ML) INTRAVENOUS PRN
Status: DISCONTINUED | OUTPATIENT
Start: 2021-02-17 | End: 2021-02-17 | Stop reason: SDUPTHER

## 2021-02-17 RX ORDER — ROCURONIUM BROMIDE 10 MG/ML
INJECTION, SOLUTION INTRAVENOUS PRN
Status: DISCONTINUED | OUTPATIENT
Start: 2021-02-17 | End: 2021-02-17 | Stop reason: SDUPTHER

## 2021-02-17 RX ORDER — MORPHINE SULFATE 2 MG/ML
2 INJECTION, SOLUTION INTRAMUSCULAR; INTRAVENOUS
Status: DISCONTINUED | OUTPATIENT
Start: 2021-02-17 | End: 2021-02-17 | Stop reason: HOSPADM

## 2021-02-17 RX ORDER — PROPOFOL 10 MG/ML
INJECTION, EMULSION INTRAVENOUS PRN
Status: DISCONTINUED | OUTPATIENT
Start: 2021-02-17 | End: 2021-02-17 | Stop reason: SDUPTHER

## 2021-02-17 RX ORDER — HYDROCODONE BITARTRATE AND ACETAMINOPHEN 5; 325 MG/1; MG/1
1 TABLET ORAL EVERY 6 HOURS PRN
Qty: 28 TABLET | Refills: 0 | Status: SHIPPED | OUTPATIENT
Start: 2021-02-17 | End: 2021-02-24

## 2021-02-17 RX ORDER — MIDAZOLAM HYDROCHLORIDE 1 MG/ML
INJECTION INTRAMUSCULAR; INTRAVENOUS PRN
Status: DISCONTINUED | OUTPATIENT
Start: 2021-02-17 | End: 2021-02-17 | Stop reason: SDUPTHER

## 2021-02-17 RX ORDER — CYCLOBENZAPRINE HCL 10 MG
10 TABLET ORAL 3 TIMES DAILY PRN
Status: DISCONTINUED | OUTPATIENT
Start: 2021-02-17 | End: 2021-02-17 | Stop reason: HOSPADM

## 2021-02-17 RX ORDER — MORPHINE SULFATE 2 MG/ML
2 INJECTION, SOLUTION INTRAMUSCULAR; INTRAVENOUS EVERY 5 MIN PRN
Status: DISCONTINUED | OUTPATIENT
Start: 2021-02-17 | End: 2021-02-17 | Stop reason: HOSPADM

## 2021-02-17 RX ORDER — SODIUM CHLORIDE 9 MG/ML
INJECTION, SOLUTION INTRAVENOUS CONTINUOUS
Status: DISCONTINUED | OUTPATIENT
Start: 2021-02-17 | End: 2021-02-17 | Stop reason: HOSPADM

## 2021-02-17 RX ORDER — FENTANYL CITRATE 50 UG/ML
INJECTION, SOLUTION INTRAMUSCULAR; INTRAVENOUS PRN
Status: DISCONTINUED | OUTPATIENT
Start: 2021-02-17 | End: 2021-02-17 | Stop reason: SDUPTHER

## 2021-02-17 RX ORDER — LIDOCAINE HCL/PF 100 MG/5ML
SYRINGE (ML) INJECTION PRN
Status: DISCONTINUED | OUTPATIENT
Start: 2021-02-17 | End: 2021-02-17 | Stop reason: SDUPTHER

## 2021-02-17 RX ORDER — ONDANSETRON 2 MG/ML
4 INJECTION INTRAMUSCULAR; INTRAVENOUS EVERY 6 HOURS PRN
Status: DISCONTINUED | OUTPATIENT
Start: 2021-02-17 | End: 2021-02-17 | Stop reason: HOSPADM

## 2021-02-17 RX ORDER — HYDROCODONE BITARTRATE AND ACETAMINOPHEN 5; 325 MG/1; MG/1
1 TABLET ORAL EVERY 6 HOURS PRN
Status: ON HOLD | COMMUNITY
End: 2021-02-17 | Stop reason: SDUPTHER

## 2021-02-17 RX ORDER — DIPHENHYDRAMINE HYDROCHLORIDE 50 MG/ML
12.5 INJECTION INTRAMUSCULAR; INTRAVENOUS
Status: DISCONTINUED | OUTPATIENT
Start: 2021-02-17 | End: 2021-02-17 | Stop reason: HOSPADM

## 2021-02-17 RX ORDER — ONDANSETRON 2 MG/ML
INJECTION INTRAMUSCULAR; INTRAVENOUS PRN
Status: DISCONTINUED | OUTPATIENT
Start: 2021-02-17 | End: 2021-02-17 | Stop reason: SDUPTHER

## 2021-02-17 RX ORDER — HYDRALAZINE HYDROCHLORIDE 20 MG/ML
5 INJECTION INTRAMUSCULAR; INTRAVENOUS EVERY 10 MIN PRN
Status: DISCONTINUED | OUTPATIENT
Start: 2021-02-17 | End: 2021-02-17 | Stop reason: HOSPADM

## 2021-02-17 RX ORDER — CYCLOBENZAPRINE HCL 10 MG
10 TABLET ORAL 3 TIMES DAILY PRN
Qty: 30 TABLET | Refills: 1 | Status: SHIPPED | OUTPATIENT
Start: 2021-02-17

## 2021-02-17 RX ORDER — NEOSTIGMINE METHYLSULFATE 5 MG/5 ML
SYRINGE (ML) INTRAVENOUS PRN
Status: DISCONTINUED | OUTPATIENT
Start: 2021-02-17 | End: 2021-02-17 | Stop reason: SDUPTHER

## 2021-02-17 RX ORDER — CEPHALEXIN 500 MG/1
500 CAPSULE ORAL 2 TIMES DAILY
Qty: 14 CAPSULE | Refills: 0 | Status: SHIPPED | OUTPATIENT
Start: 2021-02-17 | End: 2021-02-24

## 2021-02-17 RX ORDER — ONDANSETRON 2 MG/ML
4 INJECTION INTRAMUSCULAR; INTRAVENOUS
Status: DISCONTINUED | OUTPATIENT
Start: 2021-02-17 | End: 2021-02-17 | Stop reason: HOSPADM

## 2021-02-17 RX ADMIN — PHENYLEPHRINE HYDROCHLORIDE 100 MCG: 10 INJECTION INTRAVENOUS at 11:50

## 2021-02-17 RX ADMIN — PHENYLEPHRINE HYDROCHLORIDE 100 MCG: 10 INJECTION INTRAVENOUS at 11:05

## 2021-02-17 RX ADMIN — FENTANYL CITRATE 100 MCG: 50 INJECTION, SOLUTION INTRAMUSCULAR; INTRAVENOUS at 10:19

## 2021-02-17 RX ADMIN — FENTANYL CITRATE 100 MCG: 50 INJECTION, SOLUTION INTRAMUSCULAR; INTRAVENOUS at 12:02

## 2021-02-17 RX ADMIN — ROCURONIUM BROMIDE 50 MG: 10 INJECTION INTRAVENOUS at 10:22

## 2021-02-17 RX ADMIN — MEPERIDINE HYDROCHLORIDE 12.5 MG: 25 INJECTION INTRAMUSCULAR; INTRAVENOUS; SUBCUTANEOUS at 12:45

## 2021-02-17 RX ADMIN — SODIUM CHLORIDE: 9 INJECTION, SOLUTION INTRAVENOUS at 08:54

## 2021-02-17 RX ADMIN — HYDROMORPHONE HYDROCHLORIDE 1 MG: 1 INJECTION, SOLUTION INTRAMUSCULAR; INTRAVENOUS; SUBCUTANEOUS at 12:20

## 2021-02-17 RX ADMIN — Medication 0.6 MG: at 12:00

## 2021-02-17 RX ADMIN — Medication 100 MG: at 10:22

## 2021-02-17 RX ADMIN — PHENYLEPHRINE HYDROCHLORIDE 100 MCG: 10 INJECTION INTRAVENOUS at 10:46

## 2021-02-17 RX ADMIN — MEPERIDINE HYDROCHLORIDE 12.5 MG: 25 INJECTION INTRAMUSCULAR; INTRAVENOUS; SUBCUTANEOUS at 12:40

## 2021-02-17 RX ADMIN — MIDAZOLAM HYDROCHLORIDE 2 MG: 1 INJECTION, SOLUTION INTRAMUSCULAR; INTRAVENOUS at 10:13

## 2021-02-17 RX ADMIN — Medication 4 MG: at 12:00

## 2021-02-17 RX ADMIN — FENTANYL CITRATE 100 MCG: 50 INJECTION, SOLUTION INTRAMUSCULAR; INTRAVENOUS at 10:55

## 2021-02-17 RX ADMIN — ONDANSETRON HYDROCHLORIDE 4 MG: 4 INJECTION, SOLUTION INTRAMUSCULAR; INTRAVENOUS at 11:44

## 2021-02-17 RX ADMIN — SODIUM CHLORIDE: 9 INJECTION, SOLUTION INTRAVENOUS at 10:12

## 2021-02-17 RX ADMIN — DEXAMETHASONE SODIUM PHOSPHATE 8 MG: 10 INJECTION, EMULSION INTRAMUSCULAR; INTRAVENOUS at 10:34

## 2021-02-17 RX ADMIN — PROPOFOL 200 MG: 10 INJECTION, EMULSION INTRAVENOUS at 10:22

## 2021-02-17 RX ADMIN — HYDROCODONE BITARTRATE AND ACETAMINOPHEN 1 TABLET: 5; 325 TABLET ORAL at 14:32

## 2021-02-17 RX ADMIN — CEFAZOLIN 2 G: 10 INJECTION, POWDER, FOR SOLUTION INTRAVENOUS at 10:28

## 2021-02-17 RX ADMIN — BUPIVACAINE HYDROCHLORIDE 30 ML: 5 INJECTION, SOLUTION EPIDURAL; INTRACAUDAL; PERINEURAL at 09:49

## 2021-02-17 ASSESSMENT — PULMONARY FUNCTION TESTS
PIF_VALUE: 18
PIF_VALUE: 19
PIF_VALUE: 18
PIF_VALUE: 17
PIF_VALUE: 17
PIF_VALUE: 24
PIF_VALUE: 18
PIF_VALUE: 28
PIF_VALUE: 17
PIF_VALUE: 21
PIF_VALUE: 3
PIF_VALUE: 18
PIF_VALUE: 17
PIF_VALUE: 1
PIF_VALUE: 18
PIF_VALUE: 18
PIF_VALUE: 1
PIF_VALUE: 18
PIF_VALUE: 16
PIF_VALUE: 19
PIF_VALUE: 17
PIF_VALUE: 18
PIF_VALUE: 1
PIF_VALUE: 23
PIF_VALUE: 17
PIF_VALUE: 18
PIF_VALUE: 17
PIF_VALUE: 19
PIF_VALUE: 19
PIF_VALUE: 18
PIF_VALUE: 18
PIF_VALUE: 11
PIF_VALUE: 19
PIF_VALUE: 10
PIF_VALUE: 18
PIF_VALUE: 17
PIF_VALUE: 18
PIF_VALUE: 12
PIF_VALUE: 18
PIF_VALUE: 17
PIF_VALUE: 18
PIF_VALUE: 18
PIF_VALUE: 17
PIF_VALUE: 18
PIF_VALUE: 17
PIF_VALUE: 17
PIF_VALUE: 1
PIF_VALUE: 3
PIF_VALUE: 18
PIF_VALUE: 17
PIF_VALUE: 17

## 2021-02-17 ASSESSMENT — PAIN - FUNCTIONAL ASSESSMENT: PAIN_FUNCTIONAL_ASSESSMENT: 0-10

## 2021-02-17 ASSESSMENT — PAIN DESCRIPTION - LOCATION
LOCATION: SHOULDER
LOCATION: SHOULDER

## 2021-02-17 ASSESSMENT — PAIN SCALES - GENERAL
PAINLEVEL_OUTOF10: 7
PAINLEVEL_OUTOF10: 5
PAINLEVEL_OUTOF10: 6
PAINLEVEL_OUTOF10: 5
PAINLEVEL_OUTOF10: 5

## 2021-02-17 ASSESSMENT — PAIN DESCRIPTION - DESCRIPTORS: DESCRIPTORS: CONSTANT

## 2021-02-17 ASSESSMENT — PAIN DESCRIPTION - ORIENTATION: ORIENTATION: LEFT

## 2021-02-17 NOTE — ANESTHESIA POSTPROCEDURE EVALUATION
Department of Anesthesiology  Postprocedure Note    Patient: Anne Maldonado  MRN: [id number]  YOB: 1960  Date of evaluation: 2/17/2021  Time:  12:38 PM     Procedure Summary     Date: 02/17/21 Room / Location: [address] JAYE Martinez    Anesthesia Start: 1012 Anesthesia Stop: 8979    Procedure: ORIF GREATER TUBEROSITY FRACTURE LEFT SHOULDER (Left ) Diagnosis: (CLOSED NONDISPLACED FRACTURE OF PROXIMAL END OF LEFT HUMERUS)    Surgeons: Alicia Coronel MD Responsible Provider: Tamie Brown MD    Anesthesia Type: General ASA Status: 2          Anesthesia Type: General    Leonardo Phase I: Leonardo Score: 8    Leonardo Phase II:      Last vitals: Reviewed and per EMR flowsheets.        Anesthesia Post Evaluation

## 2021-02-17 NOTE — H&P
Provider   losartan (COZAAR) 50 MG tablet TAKE 1 TABLET BY MOUTH ONCE DAILY 20   Historical Provider, MD   vitamin D (ERGOCALCIFEROL) 1.25 MG (80722 UT) CAPS capsule TAKE 1 CAPSULE BY MOUTH WEEKLY FOR 8 WEEKS 20   Historical Provider, MD   omeprazole (PRILOSEC) 20 MG delayed release capsule TAKE 1 CAPSULE BY MOUTH ONCE DAILY 20   Historical Provider, MD   Azelastine HCl 137 MCG/SPRAY SOLN USE 1 SPRAY(S) IN EACH NOSTRIL TWICE DAILY 20   Historical Provider, MD   cycloSPORINE (RESTASIS) 0.05 % ophthalmic emulsion 1 drop 2 times daily    Historical Provider, MD   simvastatin (ZOCOR) 20 MG tablet   See Instructions, TAKE ONE TABLET BY MOUTH ONCE DAILY, tabs, 11 Refill(s), # 30, eRx: [id number] [address] 17   Historical Provider, MD   cetirizine (ZYRTEC) 10 MG tablet   1 tabs, Oral, Daily, PRN, # 10 tabs, 0 Refill(s) 5/3/18   Historical Provider, MD   Cetirizine HCl (ZYRTEC ALLERGY PO) Take  by mouth as needed. Historical Provider, MD   Acetaminophen (TYLENOL EXTRA STRENGTH PO) Take  by mouth as needed. Historical Provider, MD   Ibuprofen (MOTRIN PO) Take  by mouth as needed. Historical Provider, MD    Scheduled Meds:  Continuous Infusions:  PRN Meds:.       Allergies:  Amoxicillin-pot clavulanate    Social History:   Social History     Socioeconomic History    Marital status:      Spouse name: Not on file    Number of children: Not on file    Years of education: Not on file    Highest education level: Not on file   Occupational History    Not on file   Social Needs    Financial resource strain: Not on file    Food insecurity     Worry: Not on file     Inability: Not on file    Transportation needs     Medical: Not on file     Non-medical: Not on file   Tobacco Use    Smoking status: Former Smoker     Quit date: 1990     Years since quittin.1    Smokeless tobacco: Never Used   Substance and Sexual Activity    Alcohol use: Yes     Comment: occasional    Drug use: No    Sexual activity: Not on file   Lifestyle    Physical activity     Days per week: Not on file     Minutes per session: Not on file    Stress: Not on file   Relationships    Social connections     Talks on phone: Not on file     Gets together: Not on file     Attends Cheondoism service: Not on file     Active member of club or organization: Not on file     Attends meetings of clubs or organizations: Not on file     Relationship status: Not on file    Intimate partner violence     Fear of current or ex partner: Not on file     Emotionally abused: Not on file     Physically abused: Not on file     Forced sexual activity: Not on file   Other Topics Concern    Not on file   Social History Narrative    Not on file     Social History     Tobacco Use   Smoking Status Former Smoker    Quit date: 1990    Years since quittin.1   Smokeless Tobacco Never Used     Social History     Substance and Sexual Activity   Alcohol Use Yes    Comment: occasional     Social History     Substance and Sexual Activity   Drug Use No       Family History:  Family History   Problem Relation Age of Onset    Cancer Mother         colon    Heart Disease Mother         stent    Heart Disease Father     Stroke Paternal Grandmother     Stroke Paternal Grandfather          REVIEW OF SYSTEMS:  Gen: Negative for nausea, vomiting, diarrhea, fever, chills, night sweats  Heart: Negative for HTN, palpitations, chest pain  Lungs: Negative for wheezes, asthma or SOB  GI: Negative for nausea, vomiting  Endo: Negative for diabetes  Heme: Negative for DVT   Musculoskeletal:  Positive for joint swelling, arthralgia, joint stiffness    PHYSICAL EXAM:    Gen: alert and oriented x3  Head: normorcephalic, atraumatic  Neck: supple  Heart: RRR  Lungs: No audible wheezes  Abdomen: soft  Pelvis: stable  LUE:  Skin intact without significant rash, lesion, nor deformity.  Swelling noted laterally around the left shoulder with increased tenderness when palpating the proximal humerus. Pain increased with any attempt at ROM of the left shoulder. ROM elbow, wrist, fingers intact with nerve distribution of radial, ulnar, median, and PIN intact. Radial and ulnar pulses regular and intact. Intact capillary refill and sensation. DATA:  CBC:   Lab Results   Component Value Date    WBC 9.4 02/16/2021    HGB 14.0 02/16/2021     02/16/2021     BMP:    Lab Results   Component Value Date     02/16/2021    K 3.9 02/16/2021     02/16/2021    CO2 25 02/16/2021    BUN 16 02/16/2021    CREATININE 0.5 02/16/2021    CALCIUM 8.9 12/15/2012    GLUCOSE 101 02/16/2021     PT/INR:  No results found for: PROTIME, INR  Troponin:  No results found for: TROPONINI      ASSESSMENT:  1)  Closed Displaced 3-Part Left Proximal Humerus Fracture    PLAN:  As discussed with Dr Salima Yousif, ortho attending surgeon, plan is to proceed with Open Reduction and Internal Fixation of the Left Proximal Humerus at [facility] on 2/17/2021. Patient will require pre-op work-up and COVID testing in accordance with [facility] protocols. Risks such as but not limited to infection, stiffness of the shoulder, non-union of the fracture, DVTs, risks associated with anesthesia, and the risks surrounding the COVID 19 pandemic were discussed and understood. She will be placed on appropriate pain medication post-operatively, appropriate antibiotics pending the MRSA PCR, muscle relaxer. She will follow-up in office 2 weeks after her surgery for repeat evaluation with xray imaging and clinical exam. She will remain in the sling at this time and will be Non-Weightbearing to the left upper extremity. At this time understanding the pre-op and post-op protocols as well as the risks and benefits, the patient desires to proceed with the above mentioned plan. Dr Salima Yousif has seen and evaluated the patient and agrees with the above noted findings and plan at this time.         Jose L Rockwell PA-C

## 2021-02-17 NOTE — ANESTHESIA PROCEDURE NOTES
Peripheral Block    Patient location during procedure: pre-op  Start time: 2/17/2021 9:30 AM  End time: 2/17/2021 9:45 AM  Staffing  Performed: anesthesiologist   Anesthesiologist: Bertha Juarez MD  Preanesthetic Checklist  Completed: patient identified, IV checked, site marked, risks and benefits discussed, surgical consent, monitors and equipment checked, pre-op evaluation, timeout performed, anesthesia consent given, oxygen available and patient being monitored  Peripheral Block  Patient position: supine  Prep: ChloraPrep  Patient monitoring: cardiac monitor and continuous pulse ox  Block type: Brachial plexus  Laterality: left  Injection technique: single-shot  Guidance: ultrasound guided  Provider prep: mask and sterile gloves  Needle  Needle length: 5 cm  Assessment  Slow fractionated injection: yes  Hemodynamics: stable  Medications Administered  Bupivacaine (MARCAINE) PF injection 0.5%, 30 mL  Reason for block: post-op pain management

## 2021-02-17 NOTE — PROGRESS NOTES
ADMITTED TO [facility] AND ORIENTED TO UNIT. SCDS ON. FALL AND ALLERGY BANDS ON. PT VERBALIZED APPROVAL FOR FIRST NAME, LAST INITIAL AND PHYSICIAN NAME ON UNIT WHITEBOARD. Spouse Jorge Jernigan with the patient.

## 2021-02-17 NOTE — PROGRESS NOTES
Pt has met discharge criteria and states she is ready for discharge to home. IV removed, gauze and tape applied. Dressed in own clothes and personal belongings gathered. Discharge instructions (with opioid medication education information) given to pt and family; pt and family verbalized understanding of discharge instructions, prescriptions x3 and follow up appointments. Pt transported to discharge lobby by [facility] staff.

## 2021-02-17 NOTE — PROGRESS NOTES
2/17/2021 -     This RN retrieved patient from Same Day Surgery room. Patient was questioned, with return of 2 patient identifiers, statement of allergies, review of surgical procedure consented for. Patient also denied any loose or removal teeth, removed glasses and confirmed having removed all previous jewelery, denies eating or drinking anything after midnight. Patient transported to PACU area for left shoulder block procedure. Block procedure performed by Dr. Jose Hall.     Time in PACU - 0930. Time Out - 2680. Block Start - 5074. Block Finished - [id number]. Patient remained in PACU area until OR suite and staff ready. Patient monitored and accompanied by Maria Antonia, Perioperative Services Supervisor.

## 2021-02-17 NOTE — ANESTHESIA PRE PROCEDURE
Department of Anesthesiology  Preprocedure Note       Name:  Kavitha Cho   Age:  56 y.o.  :  1960                                          MRN:  [id number]         Date:  2021      Surgeon: Herminio Braden):  Nato Murphy MD    Procedure: Procedure(s):  ORIF GREATER TUBEROSITY FRACTURE LEFT SHOULDER    Medications prior to admission:   Prior to Admission medications    Medication Sig Start Date End Date Taking? Authorizing Provider   HYDROcodone-acetaminophen (NORCO) 5-325 MG per tablet Take 1 tablet by mouth every 6 hours as needed for Pain (ONE OR TWO TABS). Yes Historical Provider, MD   losartan (COZAAR) 50 MG tablet TAKE 1 TABLET BY MOUTH ONCE DAILY 20  Yes Historical Provider, MD   vitamin D (ERGOCALCIFEROL) 1.25 MG (31465 UT) CAPS capsule TAKE 1 CAPSULE BY MOUTH WEEKLY FOR 8 WEEKS 20  Yes Historical Provider, MD   omeprazole (PRILOSEC) 20 MG delayed release capsule TAKE 1 CAPSULE BY MOUTH ONCE DAILY 20  Yes Historical Provider, MD   cycloSPORINE (RESTASIS) 0.05 % ophthalmic emulsion 1 drop 2 times daily   Yes Historical Provider, MD   simvastatin (ZOCOR) 20 MG tablet   See Instructions, TAKE ONE TABLET BY MOUTH ONCE DAILY, tabs, 11 Refill(s), # 30, eRx: [address] [address] 17  Yes Historical Provider, MD   Ibuprofen (MOTRIN PO) Take  by mouth as needed. Yes Historical Provider, MD   Azelastine HCl 137 MCG/SPRAY SOLN USE 1 SPRAY(S) IN EACH NOSTRIL TWICE DAILY 20   Historical Provider, MD   cetirizine (ZYRTEC) 10 MG tablet   1 tabs, Oral, Daily, PRN, # 10 tabs, 0 Refill(s) 5/3/18   Historical Provider, MD   Cetirizine HCl (ZYRTEC ALLERGY PO) Take  by mouth as needed. Historical Provider, MD   Acetaminophen (TYLENOL EXTRA STRENGTH PO) Take  by mouth as needed.     Historical Provider, MD       Current medications:    Current Facility-Administered Medications   Medication Dose Route Frequency Provider Last Rate Last Admin  0.9 % sodium chloride infusion   Intravenous Continuous Desirae Dotson MD        ceFAZolin (ANCEF) 2000 mg in dextrose 5 % 50 mL IVPB  2,000 mg Intravenous Once Desirae Dotson MD           Allergies: Allergies   Allergen Reactions    Amoxicillin-Pot Clavulanate     Icy Hot Pain Relieving [Menthol (Topical Analgesic)] Other (See Comments)     rED AND ITCH        Problem List:    Patient Active Problem List   Diagnosis Code    Kidney stone N20.0    Ureteral stone N20.1    Incontinence of urine R32    Vaginal prolapse N81.10    Frequency [id number]    Pelvic floor instability M62.89    Cystocele [id number]    Prolapse of female genital organs N81.9    Urinary incontinence R32    History of kidney stones Z87.442    Urgency incontinence N39.41       Past Medical History:        Diagnosis Date    Arthritis     knees    Depression     Essential hypertension     Fatty liver     Pure hypercholesterolemia     Reactive airway disease     Ureteral stone        Past Surgical History:        Procedure Laterality Date    BLADDER REPAIR      COLONOSCOPY      CYSTOSCOPY  2011    stent placed     HYSTERECTOMY  2012 Dr Teri Silvestre Hysterectomy Per Dr Mary Juárez.   Sacral Colpopexy, single Incision Sub Urethral Sling Placed and Removed Due to Urethral Injury by Dr Dianna Pennington LITHOTRIPSY  2011    OVARY REMOVAL  2008    right       Social History:    Social History     Tobacco Use    Smoking status: Former Smoker     Quit date: 1990     Years since quittin.1    Smokeless tobacco: Never Used   Substance Use Topics    Alcohol use: Yes     Comment: occasional                                Counseling given: Not Answered      Vital Signs (Current):   Vitals:    21 0805 21 0815   BP: (!) 146/79    Pulse: 78    Resp: 14    Temp: 97.4 °F (36.3 °C)    SpO2: 95%    Weight:  187 lb 9.6 oz (85.1 kg) Height:  5' 1\" (1.549 m)                                              BP Readings from Last 3 Encounters:   02/17/21 (!) 146/79   02/15/21 135/66   01/12/21 122/78       NPO Status:                                                                                 BMI:   Wt Readings from Last 3 Encounters:   02/17/21 187 lb 9.6 oz (85.1 kg)   02/15/21 186 lb (84.4 kg)   01/12/21 190 lb (86.2 kg)     Body mass index is 35.45 kg/m². CBC:   Lab Results   Component Value Date    WBC 9.4 02/16/2021    RBC 4.55 02/16/2021    HGB 14.0 02/16/2021    HCT 43.1 02/16/2021    MCV 94.6 02/16/2021    RDW 13.5 02/16/2021     02/16/2021       CMP:   Lab Results   Component Value Date     02/16/2021    K 3.9 02/16/2021     02/16/2021    CO2 25 02/16/2021    BUN 16 02/16/2021    CREATININE 0.5 02/16/2021    LABGLOM >90 12/15/2012    GLUCOSE 101 02/16/2021    PROT 7.2 11/12/2012    CALCIUM 8.9 12/15/2012    BILITOT 0.7 11/12/2012    ALKPHOS 81 11/12/2012    AST 17 11/12/2012    ALT 21 11/12/2012       POC Tests: No results for input(s): POCGLU, POCNA, POCK, POCCL, POCBUN, POCHEMO, POCHCT in the last 72 hours.     Coags: No results found for: PROTIME, INR, APTT    HCG (If Applicable): No results found for: PREGTESTUR, PREGSERUM, HCG, HCGQUANT     ABGs: No results found for: PHART, PO2ART, JPC1EJX, UCG3JMS, BEART, G2IZXIGF     Type & Screen (If Applicable):  Lab Results   Component Value Date    LABRH POS 12/14/2012       Drug/Infectious Status (If Applicable):  No results found for: HIV, HEPCAB    COVID-19 Screening (If Applicable):   Lab Results   Component Value Date    COVID19 NOT DETECTED 02/16/2021         Anesthesia Evaluation    Airway: Mallampati: II       Mouth opening: > = 3 FB Dental:          Pulmonary:                              Cardiovascular:    (+) hypertension:,       ECG reviewed                        Neuro/Psych:   (+) psychiatric history:            GI/Hepatic/Renal:   (+) liver disease:, Endo/Other:                     Abdominal:   (+) obese,         Vascular:                                        Anesthesia Plan      general     ASA 2       Induction: intravenous. Anesthetic plan and risks discussed with patient. Plan discussed with CRNA.                   Kelvin Keene MD   2/17/2021

## 2021-02-17 NOTE — PROGRESS NOTES
54 ARRIVED IN PACU FROM O R AFTER GENERAL ANESTHESIA. SEE FLOW SHEET . FENTANYL WAS GIVEN PER CRNA FOR POST OP PAIN. 1220 PT. SHIVERING WITH SEVERE PAIN. DILAUDID GIVEN PER ORDER DR. Nikolay Bocanegra. SEE MAR  1240 PT. HAS SHIVERS. DEMEROL GIVEN PER ORDER SEE MAR. SAYS LEFT ARM FEELS A LITTLE NUMB . HAD BLOCK. 1250 SHIVERS STOPPED . PT. REQUESTS. SALTINE CRACKER. SAYS I THINK IT WILL MAKE ME FEEL BETTER. DENIES NAUSEA. [address] TOLERABLE AT 6 NOW. STARTING TO DOZE.   4810 SAYS JUST FEELS \"GROGGY \"NOW FROM PAIN MEDICATION   1320 TAKING ICE CHIPS PER REQUEST AND CRACKER . DENIES NAUSEA.   1342 TO \[facility]\"" .  AT BEDSIDE.  REPORT TO   IAN WINSTON

## 2021-02-17 NOTE — BRIEF OP NOTE
Brief Postoperative Note      Patient: Crista Estrada  YOB: 1960  MRN: [id number]    Date of Procedure: 2/17/2021    Pre-Op Diagnosis: CLOSED NONDISPLACED FRACTURE OF PROXIMAL END OF LEFT HUMERUS    Post-Op Diagnosis: Same       Procedure(s):  ORIF GREATER TUBEROSITY FRACTURE LEFT SHOULDER    Surgeon(s):  Toi Pina MD    Assistant:  Physician Assistant: LEONIDES Garcia    Anesthesia: General    Estimated Blood Loss (mL): less than 154     Complications: None    Specimens:   * No specimens in log *    Implants:  Implant Name Type Inv.  Item Serial No.  Lot No. LRB No. Used Action   PLATE BONE [id number] 4 H STRL LT PROX HUM S STL CVD FOR 3.5MM  PLATE BONE [id number] 4 H STRL LT PROX HUM S STL CVD FOR 3.5MM  [employer]  Left 1 Implanted         Drains: * No LDAs found *    Findings: Left 3 part proximal humerus fx    Electronically signed by Toi Pina MD on 2/17/2021 at 11:47 AM

## 2021-02-17 NOTE — PROGRESS NOTES
Pt returned to [facility] room 17. Vitals and assessment as charted. 0.9 infusing, @550ml to count from PACU. Pt has crackers and water. Family at the bedside. Pt and family verbalized understanding of discharge criteria and call light use. Call light in reach.

## 2021-02-18 NOTE — OP NOTE
[address] [id number]                                OPERATIVE REPORT    PATIENT NAME: Louise Chacko                    :        1960  MED REC NO:   [id number]                           ROOM:  ACCOUNT NO:   [de-identified]                           ADMIT DATE: 2021  PROVIDER:     Jacquie Scruggs M.D.    [place] Cure:  2021    PRINCIPAL DIAGNOSIS:  Displaced three-part proximal humerus fracture. PREOPERATIVE DIAGNOSES:  Displaced three-part proximal humerus fracture  with 1.5-cm tear, rotator cuff. PROCEDURES:  Left rotator cuff repair, 1.5 cm; ORIF of left three-part  proximal humerus fracture with locking 3.5 plates and screws. SURGEON:  Jacquie Arthur. Tracee Scruggs MD    FIRST ASSISTANT:  Velvet Huerta PA-C    COMPLICATIONS:  None. ANESTHESIA:  Interscalene blocks plus general.    CONDITION TO RECOVERY ROOM:  Good. SPONGE AND NEEDLE COUNTS:  Correct. ESTIMATED BLOOD LOSS:  100 mL. ANTIBIOTICS:  The patient was given 2 gm of Ancef prior to surgery. INDICATION FOR PROCEDURE:  The patient is a 53-year-old white female who  unfortunately tripped down some stairs, i.e., x2 in her garage, fell on  her left shoulder, evaluated in [facility] ambulatory care, and noted to have  a displaced three-part proximal humerus fracture. I recommended ORIF of  the three-part proximal humerus fracture because she probably had a  prominent rotator cuff tear. The displacement was confirmed via CT  scan. The patient and family agreed to have the above-mentioned  procedure, understood the risks and benefits, desired to have the  above-mentioned procedure, understood the pre and postop protocols and  this procedure will be performed today.     DESCRIPTION OF OPERATION:  The patient was brought to the operating room  and placed on the OR table in the supine positio and she had already had the interscalene block. She was placed in the captain's chair,  appropriate padding of the upper and lower extremities. Fluoro  confirmed good overall alignment of the fracture in both the AP and  lateral views. At this point, left shoulder and arm were scrubbed and  painted with ChloraPrep in the usual manner. After scrubbing, prepping,  and draping, I had to ellipse a previous laceration in the anterior  shoulder which she had as a kid. We ellipsed that. We made the  incision, found the deltopectoral interval.  After we found that, then  we reduced the fracture. It was obvious that the greater tuberosity  fracture was posterior. We elevated up the fractures. We curetted out  the clot, irrigated this out, reduced the fracture, placed a 5-hole  plate laterally, secured it with two pins, placed the distal screw in  first to cinch it down to bone. This was a nonlocking screw. Placed a  proximal screw in position. After doing this, then we placed multiple  screws distally, i.e., approximately seven. These were all locking  screws and I removed the unlocking screw and placed locking screw in  position, placed a nonlocking screw in a slotted hole. After we did  this, then we placed the distal screws, approximately four, with locking  screws. Good overall alignment. Good solid fixation was noted. Good  overall alignment of the fracture was noted in all views. We did have a 1.5-cm tear of the rotator cuff which was pretty much  together at this point, but we did place several stitches of #2  FiberWire into that, irrigated this out. The fascial interval was  closed with 0 Vicryl. Deep subcu was closed with 0 Vicryl, super with  2-0 Vicryl. Skin was closed with staples. Dermabond and dry sterile  dressing were applied. The patient tolerated the procedure well,  transferred to recovery room in good condition. PLAN:  To keep her in a sling and swathe for two weeks.   In two weeks, start passive range of motion. Hopefully in four to six weeks, we can  start active range of motion, maybe probably in three months until she  is back to regular activities. Keep her on calcium and vitamin D. Ultimately, she may need a DEXA scan in the future. The family asked if  maybe she had low bone density. We will keep her on calcium with  vitamin D during the interim. Elizabeth Huerta PA-C, assisted throughout the procedure with  positioning, draping, retraction, wound closure, dressing, and splint  application. MICHAEL J. Dineen Buerger, M.D.    D: 02/17/2021 11:52:45       T: 02/17/2021 15:46:44     [email or web address]  Job#: [id number]     Doc#: [id number]    CC:

## 2021-03-02 ENCOUNTER — HOSPITAL ENCOUNTER (OUTPATIENT)
Dept: GENERAL RADIOLOGY | Age: 61
Discharge: HOME | End: 2021-03-02
Payer: COMMERCIAL

## 2021-03-02 ENCOUNTER — HOSPITAL ENCOUNTER (OUTPATIENT)
Age: 61
Discharge: HOME | End: 2021-03-02
Payer: COMMERCIAL

## 2021-03-02 DIAGNOSIS — S42.292D OTHER DISPLACED FRACTURE OF UPPER END OF LEFT HUMERUS, SUBSEQUENT ENCOUNTER FOR FRACTURE WITH ROUTINE HEALING: ICD-10-CM

## 2021-03-02 PROCEDURE — 73030 X-RAY EXAM OF SHOULDER: CPT

## 2021-03-15 ENCOUNTER — TELEPHONE (OUTPATIENT)
Dept: SURGERY | Age: 61
End: 2021-03-15

## 2021-03-15 NOTE — TELEPHONE ENCOUNTER
Pt states she had a fall, broke her humerus, had to have surgery. Doesn't think she is ready to have her gallbladder out at this time. Would like to cx surgery 3/22, will call back to r/s at a later time.

## 2021-03-16 ENCOUNTER — HOSPITAL ENCOUNTER (OUTPATIENT)
Dept: GENERAL RADIOLOGY | Age: 61
Discharge: HOME | End: 2021-03-16
Payer: COMMERCIAL

## 2021-03-16 ENCOUNTER — HOSPITAL ENCOUNTER (OUTPATIENT)
Age: 61
Discharge: HOME | End: 2021-03-16
Payer: COMMERCIAL

## 2021-03-16 DIAGNOSIS — S42.292D CLOSED FRACTURE OF HEAD OF HUMERUS, LEFT, WITH ROUTINE HEALING, SUBSEQUENT ENCOUNTER: ICD-10-CM

## 2021-03-16 PROCEDURE — 73030 X-RAY EXAM OF SHOULDER: CPT

## 2021-03-30 ENCOUNTER — HOSPITAL ENCOUNTER (OUTPATIENT)
Age: 61
Discharge: HOME | End: 2021-03-30
Payer: COMMERCIAL

## 2021-03-30 ENCOUNTER — HOSPITAL ENCOUNTER (OUTPATIENT)
Dept: GENERAL RADIOLOGY | Age: 61
Discharge: HOME | End: 2021-03-30
Payer: COMMERCIAL

## 2021-03-30 DIAGNOSIS — M25.532 LEFT WRIST PAIN: ICD-10-CM

## 2021-03-30 PROCEDURE — 73110 X-RAY EXAM OF WRIST: CPT

## 2021-04-13 ENCOUNTER — HOSPITAL ENCOUNTER (OUTPATIENT)
Dept: GENERAL RADIOLOGY | Age: 61
Discharge: HOME | End: 2021-04-13
Payer: COMMERCIAL

## 2021-04-13 ENCOUNTER — HOSPITAL ENCOUNTER (OUTPATIENT)
Age: 61
Discharge: HOME | End: 2021-04-13
Payer: COMMERCIAL

## 2021-04-13 DIAGNOSIS — T14.8XXA FRACTURE: ICD-10-CM

## 2021-04-13 PROCEDURE — 73030 X-RAY EXAM OF SHOULDER: CPT

## 2021-05-11 ENCOUNTER — HOSPITAL ENCOUNTER (OUTPATIENT)
Age: 61
Discharge: HOME | End: 2021-05-11
Payer: COMMERCIAL

## 2021-05-11 ENCOUNTER — HOSPITAL ENCOUNTER (OUTPATIENT)
Dept: GENERAL RADIOLOGY | Age: 61
Discharge: HOME | End: 2021-05-11
Payer: COMMERCIAL

## 2021-05-11 DIAGNOSIS — S42.232D CLOSED 3-PART FRACTURE OF SURGICAL NECK OF LEFT HUMERUS WITH ROUTINE HEALING, SUBSEQUENT ENCOUNTER: ICD-10-CM

## 2021-05-11 PROCEDURE — 73030 X-RAY EXAM OF SHOULDER: CPT

## 2021-06-15 ENCOUNTER — HOSPITAL ENCOUNTER (OUTPATIENT)
Dept: GENERAL RADIOLOGY | Age: 61
Discharge: HOME | End: 2021-06-15
Payer: COMMERCIAL

## 2021-06-15 ENCOUNTER — HOSPITAL ENCOUNTER (OUTPATIENT)
Age: 61
Discharge: HOME | End: 2021-06-15
Payer: COMMERCIAL

## 2021-06-15 DIAGNOSIS — M17.11 PRIMARY OSTEOARTHRITIS OF RIGHT KNEE: ICD-10-CM

## 2021-06-15 PROCEDURE — 73564 X-RAY EXAM KNEE 4 OR MORE: CPT

## 2021-08-24 ENCOUNTER — HOSPITAL ENCOUNTER (OUTPATIENT)
Age: 61
Discharge: HOME | End: 2021-08-24
Payer: COMMERCIAL

## 2021-08-24 ENCOUNTER — HOSPITAL ENCOUNTER (OUTPATIENT)
Dept: GENERAL RADIOLOGY | Age: 61
Discharge: HOME | End: 2021-08-24
Payer: COMMERCIAL

## 2021-08-24 DIAGNOSIS — M17.11 PRIMARY OSTEOARTHRITIS OF RIGHT KNEE: ICD-10-CM

## 2021-08-24 PROCEDURE — 73564 X-RAY EXAM KNEE 4 OR MORE: CPT

## 2021-12-07 ENCOUNTER — HOSPITAL ENCOUNTER (OUTPATIENT)
Age: 61
Discharge: HOME | End: 2021-12-07
Payer: COMMERCIAL

## 2021-12-07 ENCOUNTER — HOSPITAL ENCOUNTER (OUTPATIENT)
Dept: GENERAL RADIOLOGY | Age: 61
Discharge: HOME | End: 2021-12-07
Payer: COMMERCIAL

## 2021-12-07 DIAGNOSIS — T14.8XXA FRACTURE: ICD-10-CM

## 2021-12-07 DIAGNOSIS — R52 PAIN: ICD-10-CM

## 2021-12-07 PROCEDURE — 73564 X-RAY EXAM KNEE 4 OR MORE: CPT

## 2021-12-07 PROCEDURE — 73030 X-RAY EXAM OF SHOULDER: CPT

## 2022-05-24 ENCOUNTER — HOSPITAL ENCOUNTER (OUTPATIENT)
Dept: GENERAL RADIOLOGY | Age: 62
Discharge: HOME | End: 2022-05-24
Payer: COMMERCIAL

## 2022-05-24 ENCOUNTER — HOSPITAL ENCOUNTER (OUTPATIENT)
Age: 62
Discharge: HOME | End: 2022-05-24
Payer: COMMERCIAL

## 2022-05-24 DIAGNOSIS — T14.8XXA FRACTURE: ICD-10-CM

## 2022-05-24 DIAGNOSIS — R52 PAIN: ICD-10-CM

## 2022-05-24 PROCEDURE — 73564 X-RAY EXAM KNEE 4 OR MORE: CPT

## 2022-05-24 PROCEDURE — 73030 X-RAY EXAM OF SHOULDER: CPT

## 2022-05-24 PROCEDURE — 73110 X-RAY EXAM OF WRIST: CPT

## 2022-06-14 ENCOUNTER — HOSPITAL ENCOUNTER (OUTPATIENT)
Age: 62
Discharge: HOME | End: 2022-06-14
Payer: COMMERCIAL

## 2022-06-14 LAB
ERYTHROCYTE [DISTWIDTH] IN BLOOD BY AUTOMATED COUNT: 13.3 % (ref 11.5–14.5)
ERYTHROCYTE [DISTWIDTH] IN BLOOD BY AUTOMATED COUNT: 47.5 FL (ref 35–45)
HCT VFR BLD CALC: 45.5 % (ref 37–47)
HEMOGLOBIN: 14.4 GM/DL (ref 12–16)
MCH RBC QN AUTO: 30.8 PG (ref 26–33)
MCHC RBC AUTO-ENTMCNC: 31.6 GM/DL (ref 32.2–35.5)
MCV RBC AUTO: 97.2 FL (ref 81–99)
PLATELET # BLD: 423 THOU/MM3 (ref 130–400)
PMV BLD AUTO: 9 FL (ref 9.4–12.4)
RBC # BLD: 4.68 MILL/MM3 (ref 4.2–5.4)
SEDIMENTATION RATE, ERYTHROCYTE: 19 MM/HR (ref 0–20)
WBC # BLD: 10 THOU/MM3 (ref 4.8–10.8)

## 2022-06-14 PROCEDURE — 82306 VITAMIN D 25 HYDROXY: CPT

## 2022-06-14 PROCEDURE — 36415 COLL VENOUS BLD VENIPUNCTURE: CPT

## 2022-06-14 PROCEDURE — 84550 ASSAY OF BLOOD/URIC ACID: CPT

## 2022-06-14 PROCEDURE — 85027 COMPLETE CBC AUTOMATED: CPT

## 2022-06-14 PROCEDURE — 86200 CCP ANTIBODY: CPT

## 2022-06-14 PROCEDURE — 86038 ANTINUCLEAR ANTIBODIES: CPT

## 2022-06-14 PROCEDURE — 86430 RHEUMATOID FACTOR TEST QUAL: CPT

## 2022-06-14 PROCEDURE — 86140 C-REACTIVE PROTEIN: CPT

## 2022-06-14 PROCEDURE — 85651 RBC SED RATE NONAUTOMATED: CPT

## 2022-06-15 LAB
C-REACTIVE PROTEIN: < 0.3 MG/DL (ref 0–1)
RHEUMATOID FACTOR: < 10 IU/ML (ref 0–13)
URIC ACID: 5.5 MG/DL (ref 2.4–5.7)
VITAMIN D 25-HYDROXY: 34 NG/ML (ref 30–100)

## 2022-06-16 LAB — CYCLIC CITRULLINATED PEPTIDE ANTIBODY IGG: 2 U/ML (ref 0–7)

## 2022-06-17 LAB — ANA SCREEN: NORMAL

## 2022-08-12 ENCOUNTER — TELEPHONE (OUTPATIENT)
Dept: FAMILY MEDICINE CLINIC | Age: 62
End: 2022-08-12

## 2022-08-12 NOTE — TELEPHONE ENCOUNTER
Left message on answering machine requesting pt to call back at earliest convenience. Dr Sue Birmingham received req for refill on Simvastatin 20mg once daily. Check with patient if she is going to continue to see Dr Sue Birmingham in [facility] or if she is going to establish with another provider. If patient is going to continue with Dr Sue Birmingham she need updated labs and office visit.

## 2022-08-16 NOTE — TELEPHONE ENCOUNTER
Patient called stating that she is going to stay with a provider in [place] and that her medication has already been refilled.

## 2023-02-09 ENCOUNTER — HOSPITAL ENCOUNTER (OUTPATIENT)
Age: 63
Discharge: HOME | End: 2023-02-09
Payer: COMMERCIAL

## 2023-02-09 ENCOUNTER — HOSPITAL ENCOUNTER (OUTPATIENT)
Dept: GENERAL RADIOLOGY | Age: 63
Discharge: HOME | End: 2023-02-09
Payer: COMMERCIAL

## 2023-02-09 DIAGNOSIS — M17.11 UNILATERAL PRIMARY OSTEOARTHRITIS, RIGHT KNEE: ICD-10-CM

## 2023-02-09 DIAGNOSIS — Z22.322 CARRIER OR SUSPECTED CARRIER OF METHICILLIN RESISTANT STAPHYLOCOCCUS AUREUS: ICD-10-CM

## 2023-02-09 DIAGNOSIS — Z01.810 ENCOUNTER FOR PREPROCEDURAL CARDIOVASCULAR EXAMINATION: ICD-10-CM

## 2023-02-09 DIAGNOSIS — Z01.811 ENCOUNTER FOR PREPROCEDURAL RESPIRATORY EXAMINATION: ICD-10-CM

## 2023-02-09 DIAGNOSIS — Z01.812 ENCOUNTER FOR PREPROCEDURAL LABORATORY EXAMINATION: ICD-10-CM

## 2023-02-09 LAB
BACTERIA: NORMAL
BILIRUB UR QL STRIP: NEGATIVE
CASTS #/AREA URNS LPF: NORMAL /LPF
CASTS #/AREA URNS LPF: NORMAL /LPF
CHARACTER UR: CLEAR
CHARCOAL URNS QL MICRO: NORMAL
COLOR UR: YELLOW
CRYSTALS URNS QL MICRO: NORMAL
EPITHELIAL CELLS, UA: NORMAL /HPF
GLUCOSE UR QL STRIP.AUTO: NEGATIVE MG/DL
HGB UR QL STRIP.AUTO: NEGATIVE
KETONES UR QL STRIP.AUTO: NEGATIVE
LEUKOCYTE ESTERASE UR QL STRIP.AUTO: NEGATIVE
NITRITE UR QL STRIP.AUTO: NEGATIVE
PH UR STRIP.AUTO: 6 [PH] (ref 5–9)
PROT UR STRIP.AUTO-MCNC: NEGATIVE MG/DL
RBC #/AREA URNS HPF: NORMAL /HPF
RENAL EPI CELLS #/AREA URNS HPF: NORMAL /[HPF]
SPECIFIC GRAVITY UA: 1.02 (ref 1–1.03)
UROBILINOGEN, URINE: 0.2 EU/DL (ref 0–1)
WBC #/AREA URNS HPF: NORMAL /HPF
YEAST LIKE FUNGI URNS QL MICRO: NORMAL

## 2023-02-09 PROCEDURE — 87641 MR-STAPH DNA AMP PROBE: CPT

## 2023-02-09 PROCEDURE — 71046 X-RAY EXAM CHEST 2 VIEWS: CPT

## 2023-02-09 PROCEDURE — 81001 URINALYSIS AUTO W/SCOPE: CPT

## 2023-02-10 LAB — MRSA DNA SPEC QL NAA+PROBE: NEGATIVE

## 2023-02-22 ENCOUNTER — HOSPITAL ENCOUNTER (EMERGENCY)
Age: 63
Discharge: HOME | End: 2023-02-22
Attending: FAMILY MEDICINE
Payer: COMMERCIAL

## 2023-02-22 VITALS
RESPIRATION RATE: 16 BRPM | OXYGEN SATURATION: 99 % | TEMPERATURE: 98.2 F | SYSTOLIC BLOOD PRESSURE: 156 MMHG | HEART RATE: 97 BPM | BODY MASS INDEX: 34.36 KG/M2 | DIASTOLIC BLOOD PRESSURE: 91 MMHG | WEIGHT: 182 LBS | HEIGHT: 61 IN

## 2023-02-22 DIAGNOSIS — M25.561 ACUTE POSTOPERATIVE PAIN OF RIGHT KNEE: Primary | ICD-10-CM

## 2023-02-22 DIAGNOSIS — G89.18 ACUTE POSTOPERATIVE PAIN OF RIGHT KNEE: Primary | ICD-10-CM

## 2023-02-22 PROCEDURE — 99282 EMERGENCY DEPT VISIT SF MDM: CPT

## 2023-02-22 RX ORDER — HYDROCODONE BITARTRATE AND ACETAMINOPHEN 5; 325 MG/1; MG/1
1 TABLET ORAL EVERY 6 HOURS PRN
COMMUNITY

## 2023-02-22 RX ORDER — M-VIT,TX,IRON,MINS/CALC/FOLIC 27MG-0.4MG
1 TABLET ORAL DAILY
COMMUNITY

## 2023-02-22 RX ORDER — DILTIAZEM HYDROCHLORIDE 120 MG/1
120 TABLET, FILM COATED ORAL 4 TIMES DAILY
COMMUNITY

## 2023-02-22 RX ORDER — COLCHICINE 0.6 MG/1
0.6 TABLET ORAL DAILY
COMMUNITY

## 2023-02-22 RX ORDER — CEPHALEXIN 500 MG/1
500 CAPSULE ORAL 2 TIMES DAILY
COMMUNITY

## 2023-02-22 ASSESSMENT — PAIN DESCRIPTION - LOCATION: LOCATION: KNEE

## 2023-02-22 ASSESSMENT — PAIN - FUNCTIONAL ASSESSMENT
PAIN_FUNCTIONAL_ASSESSMENT: NONE - DENIES PAIN
PAIN_FUNCTIONAL_ASSESSMENT: 0-10

## 2023-02-22 ASSESSMENT — LIFESTYLE VARIABLES: HOW OFTEN DO YOU HAVE A DRINK CONTAINING ALCOHOL: NEVER

## 2023-02-22 ASSESSMENT — PAIN DESCRIPTION - ORIENTATION: ORIENTATION: RIGHT

## 2023-02-22 ASSESSMENT — PAIN SCALES - GENERAL
PAINLEVEL_OUTOF10: 0
PAINLEVEL_OUTOF10: 3

## 2023-02-23 ASSESSMENT — ENCOUNTER SYMPTOMS
NAUSEA: 0
VOMITING: 0

## 2023-02-23 NOTE — ED NOTES
Observed patient resp easy, sitting on the bed. Patient stable for discharge instructions provided. Patient educated on pain control, icing, resting leg, signs and symptoms and follow up. Patient verbalized understanding, questions answered. Observed patient steadily ambulate from the department after discharge with walker.      Tyrese Jackson RN  02/22/23 1282

## 2023-02-23 NOTE — ED PROVIDER NOTES
[facility]  eMERGENCY dEPARTMENT eNCOUnter          CHIEF COMPLAINT       Chief Complaint   Patient presents with    Knee Pain     Post op last wed. Per [facility] Dr. Azeem Davis       Nurses Notes reviewed and I agree except as noted in the HPI. HISTORY OF PRESENT ILLNESS    Rob Daniel is a 57 y.o. female who presents with right knee concerns. Patient underwent right total knee replacement on 2/15/23. Patient notes some mild low grade fever today at home. Currently afebrile. Denies wound changes. Denies increase right knee pain. Patient is concerned about possible right knee infection. REVIEW OF SYSTEMS     Review of Systems   Constitutional:  Negative for chills and fever. Gastrointestinal:  Negative for nausea and vomiting. Musculoskeletal:  Positive for arthralgias (right knee replacement). Skin:  Positive for wound. Negative for rash. Psychiatric/Behavioral:  Negative for agitation. The patient is nervous/anxious. All other systems reviewed and are negative. PAST MEDICAL HISTORY    has a past medical history of Arthritis, Depression, Essential hypertension, Fatty liver, Pure hypercholesterolemia, Reactive airway disease, and Ureteral stone. SURGICAL HISTORY      has a past surgical history that includes Ovary removal (01/01/2008); Lithotripsy (01/01/2011); Cystoscopy (01/01/2011); Colonoscopy; Hysterectomy (12/14/2012 Dr Marco Dietz); bladder repair; shoulder surgery (Left, 02/17/2021); and Total knee arthroplasty (Right).     CURRENT MEDICATIONS       Discharge Medication List as of 2/22/2023  9:59 PM        CONTINUE these medications which have NOT CHANGED    Details   aspirin 325 MG EC tablet Take 325 mg by mouth dailyHistorical Med      cephALEXin (KEFLEX) 500 MG capsule Take 500 mg by mouth 2 times dailyHistorical Med      colchicine (COLCRYS) 0.6 MG tablet Take 0.6 mg by mouth dailyHistorical Med      dilTIAZem (CARDIZEM) 120 MG tablet Take 120 mg by mouth 4 times dailyHistorical Med      HYDROcodone-acetaminophen (NORCO) 5-325 MG per tablet Take 1 tablet by mouth every 6 hours as needed for Pain. Historical Med      Multiple Vitamins-Minerals (THERAPEUTIC MULTIVITAMIN-MINERALS) tablet Take 1 tablet by mouth dailyHistorical Med      cyclobenzaprine (FLEXERIL) 10 MG tablet Take 1 tablet by mouth 3 times daily as needed for Muscle spasms, Disp-30 tablet, R-1Print      losartan (COZAAR) 50 MG tablet TAKE 1 TABLET BY MOUTH ONCE DAILYHistorical Med      vitamin D (ERGOCALCIFEROL) 1.25 MG (81246 UT) CAPS capsule TAKE 1 CAPSULE BY MOUTH WEEKLY FOR 8 WEEKSHistorical Med      cycloSPORINE (RESTASIS) 0.05 % ophthalmic emulsion 1 drop 2 times dailyHistorical Med      Cetirizine HCl (ZYRTEC ALLERGY PO) Take  by mouth as needed. Acetaminophen (TYLENOL EXTRA STRENGTH PO) Take  by mouth as needed. Ibuprofen (MOTRIN PO) Take  by mouth as needed. ALLERGIES     is allergic to amoxicillin-pot clavulanate, adhesive tape, and icy hot pain relieving [menthol (topical analgesic)]. FAMILY HISTORY     She indicated that her mother is . She indicated that her father is . She indicated that the status of her paternal grandmother is unknown. She indicated that the status of her paternal grandfather is unknown.   family history includes Cancer in her mother; Heart Disease in her father and mother; Stroke in her paternal grandfather and paternal grandmother. SOCIAL HISTORY      reports that she quit smoking about 33 years ago. Her smoking use included cigarettes. She has never used smokeless tobacco. She reports current alcohol use. She reports that she does not use drugs. PHYSICAL EXAM     INITIAL VITALS:  height is 5' 1\" (1.549 m) and weight is 182 lb (82.6 kg). Her oral temperature is 98.2 °F (36.8 °C). Her blood pressure is 156/91 (abnormal) and her pulse is 97. Her respiration is 16 and oxygen saturation is 99%.     Physical Exam  Vitals and nursing note reviewed. Constitutional:       General: She is not in acute distress. Appearance: She is not ill-appearing. Musculoskeletal:         General: Swelling present. No tenderness. Right lower leg: No edema. Left lower leg: No edema. Skin:     Capillary Refill: Capillary refill takes less than 2 seconds. Comments: Wound clean,dry,intact. No local erythema noted. Neurological:      Mental Status: She is alert. DIFFERENTIAL DIAGNOSIS:       DIAGNOSTIC RESULTS       LABS:   Labs Reviewed - No data to display    EMERGENCY DEPARTMENT COURSE:   Vitals:    Vitals:    02/22/23 2046 02/22/23 2200   BP: (!) 152/67 (!) 156/91   Pulse: (!) 118 97   Resp: 16 16   Temp: 98.2 °F (36.8 °C)    TempSrc: Oral    SpO2: 97% 99%   Weight: 182 lb (82.6 kg)    Height: 5' 1\" (1.549 m)      Currently afebrile. Right total knee incision clean,dry, intact. No erythema noted. Case discussed with ortho at this time would not advise further workup. Patient informed of early indicators of joint infection including fever,increasing pain,discharge from joint. Recommended follow up with ortho tomorrow. Care instructions provided. CRITICAL CARE:       CONSULTS:  Ortho     PROCEDURES:  None    FINAL IMPRESSION      1. Acute postoperative pain of right knee          DISPOSITION/PLAN   Home. Care instructions provided. Follow up with orthopedics.      PATIENT REFERRED TO:  Dominic Mitchell MD  [facility]  [phone]    Call in 1 day        DISCHARGE MEDICATIONS:  Discharge Medication List as of 2/22/2023  9:59 PM          (Please note that portions of this note were completed with a voice recognition program.  Efforts were made to edit the dictations but occasionally words are mis-transcribed.)    MD Ghada Briscoe MD  02/23/23 0700

## 2023-02-23 NOTE — ED NOTES
Observed 17 stitches intact, redness and warmth at the lower part of incision, without drainage, intact. Large amount of discoloration observed on right leg, with redness and warmth.      Pam Harrington RN  02/22/23 2107

## 2023-02-23 NOTE — ED TRIAGE NOTES
Patient reported, \"post surgical last Wed, per [facility] on right knee. Today it is warm, tender and red on the inside of the right knee. \" Observed dressing in place with compression stockings on.

## 2023-02-28 ENCOUNTER — HOSPITAL ENCOUNTER (OUTPATIENT)
Age: 63
Discharge: HOME | End: 2023-02-28
Payer: COMMERCIAL

## 2023-02-28 ENCOUNTER — HOSPITAL ENCOUNTER (OUTPATIENT)
Dept: ULTRASOUND IMAGING | Age: 63
Discharge: HOME | End: 2023-02-28
Payer: COMMERCIAL

## 2023-02-28 ENCOUNTER — HOSPITAL ENCOUNTER (OUTPATIENT)
Dept: GENERAL RADIOLOGY | Age: 63
Discharge: HOME | End: 2023-02-28
Payer: COMMERCIAL

## 2023-02-28 DIAGNOSIS — M79.661 PAIN OF RIGHT LOWER LEG: ICD-10-CM

## 2023-02-28 DIAGNOSIS — Z96.651 PRESENCE OF RIGHT ARTIFICIAL KNEE JOINT: ICD-10-CM

## 2023-02-28 DIAGNOSIS — M17.11 ARTHRITIS OF KNEE, RIGHT: ICD-10-CM

## 2023-02-28 PROCEDURE — 73562 X-RAY EXAM OF KNEE 3: CPT

## 2023-02-28 PROCEDURE — 93971 EXTREMITY STUDY: CPT

## 2023-03-28 ENCOUNTER — HOSPITAL ENCOUNTER (OUTPATIENT)
Age: 63
Discharge: HOME | End: 2023-03-28
Payer: COMMERCIAL

## 2023-03-28 ENCOUNTER — HOSPITAL ENCOUNTER (OUTPATIENT)
Dept: GENERAL RADIOLOGY | Age: 63
Discharge: HOME | End: 2023-03-28
Payer: COMMERCIAL

## 2023-03-28 DIAGNOSIS — Z96.651 PRESENCE OF TOTAL RIGHT KNEE JOINT PROSTHESIS: ICD-10-CM

## 2023-03-28 PROCEDURE — 73562 X-RAY EXAM OF KNEE 3: CPT

## 2023-05-09 ENCOUNTER — HOSPITAL ENCOUNTER (OUTPATIENT)
Age: 63
Discharge: HOME | End: 2023-05-09
Payer: COMMERCIAL

## 2023-05-09 ENCOUNTER — HOSPITAL ENCOUNTER (OUTPATIENT)
Dept: GENERAL RADIOLOGY | Age: 63
Discharge: HOME | End: 2023-05-09
Payer: COMMERCIAL

## 2023-05-09 DIAGNOSIS — Z96.651 AFTERCARE FOLLOWING RIGHT KNEE JOINT REPLACEMENT SURGERY: ICD-10-CM

## 2023-05-09 DIAGNOSIS — Z96.651 PRESENCE OF RIGHT ARTIFICIAL KNEE JOINT: ICD-10-CM

## 2023-05-09 DIAGNOSIS — Z47.1 AFTERCARE FOLLOWING RIGHT KNEE JOINT REPLACEMENT SURGERY: ICD-10-CM

## 2023-05-09 PROCEDURE — 73562 X-RAY EXAM OF KNEE 3: CPT

## 2024-01-08 ENCOUNTER — HOSPITAL ENCOUNTER (OUTPATIENT)
Age: 64
End: 2024-01-08

## 2024-01-29 ENCOUNTER — HOSPITAL ENCOUNTER (OUTPATIENT)
Age: 64
Discharge: HOME | End: 2024-01-29

## 2024-01-30 ENCOUNTER — HOSPITAL ENCOUNTER (OUTPATIENT)
Age: 64
Discharge: HOME | End: 2024-01-30
Payer: COMMERCIAL

## 2024-01-30 ENCOUNTER — HOSPITAL ENCOUNTER (OUTPATIENT)
Dept: GENERAL RADIOLOGY | Age: 64
Discharge: HOME | End: 2024-01-30
Payer: COMMERCIAL

## 2024-01-30 DIAGNOSIS — Z47.1 AFTERCARE FOLLOWING RIGHT KNEE JOINT REPLACEMENT SURGERY: ICD-10-CM

## 2024-01-30 DIAGNOSIS — Z96.651 AFTERCARE FOLLOWING RIGHT KNEE JOINT REPLACEMENT SURGERY: ICD-10-CM

## 2024-01-30 DIAGNOSIS — Z96.651 HISTORY OF TOTAL KNEE REPLACEMENT, RIGHT: ICD-10-CM

## 2024-01-30 PROCEDURE — 73562 X-RAY EXAM OF KNEE 3: CPT

## 2024-02-06 ENCOUNTER — HOSPITAL ENCOUNTER (OUTPATIENT)
Dept: GENERAL RADIOLOGY | Age: 64
Discharge: HOME | End: 2024-02-06
Attending: ORTHOPAEDIC SURGERY
Payer: COMMERCIAL

## 2024-02-06 ENCOUNTER — HOSPITAL ENCOUNTER (OUTPATIENT)
Age: 64
Discharge: HOME | End: 2024-02-06
Payer: COMMERCIAL

## 2024-02-06 DIAGNOSIS — Z96.651 AFTERCARE FOLLOWING RIGHT KNEE JOINT REPLACEMENT SURGERY: ICD-10-CM

## 2024-02-06 DIAGNOSIS — Z47.1 AFTERCARE FOLLOWING RIGHT KNEE JOINT REPLACEMENT SURGERY: ICD-10-CM

## 2024-02-06 PROCEDURE — 73562 X-RAY EXAM OF KNEE 3: CPT

## 2024-12-03 ENCOUNTER — HOSPITAL ENCOUNTER (OUTPATIENT)
Dept: GENERAL RADIOLOGY | Age: 64
Discharge: HOME | End: 2024-12-03
Attending: ORTHOPAEDIC SURGERY
Payer: COMMERCIAL

## 2024-12-03 ENCOUNTER — HOSPITAL ENCOUNTER (OUTPATIENT)
Age: 64
Discharge: HOME | End: 2024-12-03
Payer: COMMERCIAL

## 2024-12-03 DIAGNOSIS — M79.672 LEFT FOOT PAIN: ICD-10-CM

## 2024-12-03 PROCEDURE — 73630 X-RAY EXAM OF FOOT: CPT

## 2024-12-17 ENCOUNTER — HOSPITAL ENCOUNTER (OUTPATIENT)
Dept: GENERAL RADIOLOGY | Age: 64
Discharge: HOME | End: 2024-12-17
Attending: ORTHOPAEDIC SURGERY
Payer: COMMERCIAL

## 2024-12-17 ENCOUNTER — HOSPITAL ENCOUNTER (OUTPATIENT)
Age: 64
Discharge: HOME | End: 2024-12-17
Payer: COMMERCIAL

## 2024-12-17 DIAGNOSIS — M79.672 LEFT FOOT PAIN: ICD-10-CM

## 2024-12-17 PROCEDURE — 73630 X-RAY EXAM OF FOOT: CPT

## 2025-01-14 ENCOUNTER — HOSPITAL ENCOUNTER (OUTPATIENT)
Age: 65
Discharge: HOME | End: 2025-01-14
Payer: COMMERCIAL

## 2025-01-14 ENCOUNTER — HOSPITAL ENCOUNTER (OUTPATIENT)
Dept: GENERAL RADIOLOGY | Age: 65
Discharge: HOME | End: 2025-01-14
Attending: ORTHOPAEDIC SURGERY
Payer: COMMERCIAL

## 2025-01-14 DIAGNOSIS — S92.355D CLOSED NONDISPLACED FRACTURE OF FIFTH METATARSAL BONE OF LEFT FOOT WITH ROUTINE HEALING: ICD-10-CM

## 2025-01-14 PROCEDURE — 73630 X-RAY EXAM OF FOOT: CPT

## (undated) DEVICE — APPLICATOR MEDICATED 26 CC SOLUTION HI LT ORNG CHLORAPREP

## (undated) DEVICE — GLOVE ORANGE PI 7   MSG9070

## (undated) DEVICE — PAD,ABDOMINAL,5"X9",ST,LF,25/BX: Brand: MEDLINE INDUSTRIES, INC.

## (undated) DEVICE — GLOVE ORANGE PI 8   MSG9080

## (undated) DEVICE — SUTURE FIBERWIRE SZ 5 L38IN NONABSORBABLE BLU L48MM 1/2 AR7211

## (undated) DEVICE — EVOS 3.5MM X 32MM CORTEX SCREW SELF-TAPPING
Type: IMPLANTABLE DEVICE | Status: NON-FUNCTIONAL
Brand: EVOS

## (undated) DEVICE — DRAPE C ARM W36XL30IN RECTANG BND BG AND TAPE

## (undated) DEVICE — PERI-LOC K-WIRE 2.0MM X 150MM                                    LENGTH TROCAR POINT
Type: IMPLANTABLE DEVICE | Status: NON-FUNCTIONAL
Brand: PERI-LOC
Removed: 2021-02-17

## (undated) DEVICE — KIT INFUS PMP 100ML 2M/HR SOAK CATH L2.5IN N NARC ON-Q

## (undated) DEVICE — SUTURE ETHBND EXCEL SZ 2 L20IN NONABSORBABLE GRN L75MM LR X406T

## (undated) DEVICE — SUTURE VCRL + SZ 2-0 L27IN ABSRB UD CP-1 1/2 CIR REV CUT VCP266H

## (undated) DEVICE — 3M™ MICROFOAM™ TAPE 1528-4: Brand: 3M™ MICROFOAM™

## (undated) DEVICE — ADHESIVE SKIN CLSR 0.7ML TOP DERMBND ADV

## (undated) DEVICE — DRESSING,GAUZE,XEROFORM,CURAD,5"X9",ST: Brand: CURAD

## (undated) DEVICE — BASIC SINGLE BASIN BTC-LF: Brand: MEDLINE INDUSTRIES, INC.

## (undated) DEVICE — DRAPE,U/SHT,SPLIT,FILM,60X84,STERILE: Brand: MEDLINE

## (undated) DEVICE — GAUZE,SPONGE,8"X4",12PLY,XRAY,STRL,LF: Brand: MEDLINE

## (undated) DEVICE — SPONGE GZ W4XL4IN COT 12 PLY TYP VII WVN C FLD DSGN

## (undated) DEVICE — SLING ARM L 33-38CM BLK MESH FAB EZ OPN FR PNL W/

## (undated) DEVICE — EVOS 3.5MM X 26MM CORTEX SCREW SELF-TAPPING
Type: IMPLANTABLE DEVICE | Status: NON-FUNCTIONAL
Brand: EVOS

## (undated) DEVICE — INTENDED FOR TISSUE SEPARATION, AND OTHER PROCEDURES THAT REQUIRE A SHARP SURGICAL BLADE TO PUNCTURE OR CUT.: Brand: BARD-PARKER ® CARBON RIB-BACK BLADES

## (undated) DEVICE — SUTURE VIC + LEN CP1 0 70CM VCP267H

## (undated) DEVICE — SUTURE FIBERWIRE SZ 2 W/ TAPERED NEEDLE BLUE L38IN NONABSORB BLU L26.5MM 1/2 CIRCLE AR7200

## (undated) DEVICE — Device

## (undated) DEVICE — EVOS SMALL 2.5MM DRILL W/AO QC LONG: Brand: EVOS

## (undated) DEVICE — SUTURE MERS 5MM TAPE L12IN NONABSORBABLE WHT L48MM CTX 1/2 RS22

## (undated) DEVICE — 3M™ IOBAN™ 2 ANTIMICROBIAL INCISE DRAPE 6650EZ: Brand: IOBAN™ 2